# Patient Record
Sex: FEMALE | Race: BLACK OR AFRICAN AMERICAN | NOT HISPANIC OR LATINO | URBAN - METROPOLITAN AREA
[De-identification: names, ages, dates, MRNs, and addresses within clinical notes are randomized per-mention and may not be internally consistent; named-entity substitution may affect disease eponyms.]

---

## 2019-09-17 ENCOUNTER — HOSPITAL ENCOUNTER (OUTPATIENT)
Facility: HOSPITAL | Age: 69
Discharge: HOME | End: 2019-09-19
Attending: FAMILY MEDICINE | Admitting: INTERNAL MEDICINE
Payer: COMMERCIAL

## 2019-09-17 DIAGNOSIS — I63.9 CVA (CEREBRAL VASCULAR ACCIDENT): ICD-10-CM

## 2019-09-17 DIAGNOSIS — N17.9 AKI (ACUTE KIDNEY INJURY): Primary | ICD-10-CM

## 2019-09-17 DIAGNOSIS — G93.40 ACUTE ENCEPHALOPATHY: ICD-10-CM

## 2019-09-17 DIAGNOSIS — R41.82 ALTERED MENTAL STATUS: ICD-10-CM

## 2019-09-17 PROBLEM — E03.9 ACQUIRED HYPOTHYROIDISM: Status: ACTIVE | Noted: 2019-09-17

## 2019-09-17 PROBLEM — I10 ESSENTIAL HYPERTENSION: Status: ACTIVE | Noted: 2019-09-17

## 2019-09-17 LAB
ALBUMIN SERPL BCP-MCNC: 2.3 G/DL (ref 3.5–5.2)
ALP SERPL-CCNC: 63 U/L (ref 55–135)
ALT SERPL W/O P-5'-P-CCNC: 18 U/L (ref 10–44)
ANION GAP SERPL CALC-SCNC: 13 MMOL/L (ref 8–16)
AST SERPL-CCNC: 42 U/L (ref 10–40)
BASOPHILS # BLD AUTO: 0 K/UL (ref 0–0.2)
BASOPHILS NFR BLD: 0 % (ref 0–1.9)
BILIRUB SERPL-MCNC: 1.1 MG/DL (ref 0.1–1)
BNP SERPL-MCNC: 215 PG/ML (ref 0–99)
BUN SERPL-MCNC: 52 MG/DL (ref 8–23)
CALCIUM SERPL-MCNC: 10.4 MG/DL (ref 8.7–10.5)
CHLORIDE SERPL-SCNC: 97 MMOL/L (ref 95–110)
CO2 SERPL-SCNC: 24 MMOL/L (ref 23–29)
CREAT SERPL-MCNC: 1.9 MG/DL (ref 0.5–1.4)
DIFFERENTIAL METHOD: ABNORMAL
EOSINOPHIL # BLD AUTO: 0 K/UL (ref 0–0.5)
EOSINOPHIL NFR BLD: 0 % (ref 0–8)
ERYTHROCYTE [DISTWIDTH] IN BLOOD BY AUTOMATED COUNT: 14 % (ref 11.5–14.5)
EST. GFR  (AFRICAN AMERICAN): 30 ML/MIN/1.73 M^2
EST. GFR  (NON AFRICAN AMERICAN): 26 ML/MIN/1.73 M^2
GLUCOSE SERPL-MCNC: 143 MG/DL (ref 70–110)
HCT VFR BLD AUTO: 27.5 % (ref 37–48.5)
HGB BLD-MCNC: 9.2 G/DL (ref 12–16)
INR PPP: 1 (ref 0.8–1.2)
LACTATE SERPL-SCNC: 1.1 MMOL/L (ref 0.5–2.2)
LYMPHOCYTES # BLD AUTO: 0.4 K/UL (ref 1–4.8)
LYMPHOCYTES NFR BLD: 6.4 % (ref 18–48)
MCH RBC QN AUTO: 27.7 PG (ref 27–31)
MCHC RBC AUTO-ENTMCNC: 33.5 G/DL (ref 32–36)
MCV RBC AUTO: 83 FL (ref 82–98)
MONOCYTES # BLD AUTO: 0.1 K/UL (ref 0.3–1)
MONOCYTES NFR BLD: 2.3 % (ref 4–15)
NEUTROPHILS # BLD AUTO: 5.5 K/UL (ref 1.8–7.7)
NEUTROPHILS NFR BLD: 91.8 % (ref 38–73)
PLATELET # BLD AUTO: 322 K/UL (ref 150–350)
PMV BLD AUTO: 10.4 FL (ref 9.2–12.9)
POTASSIUM SERPL-SCNC: 3.6 MMOL/L (ref 3.5–5.1)
PROT SERPL-MCNC: 8 G/DL (ref 6–8.4)
PROTHROMBIN TIME: 10.8 SEC (ref 9–12.5)
RBC # BLD AUTO: 3.32 M/UL (ref 4–5.4)
SODIUM SERPL-SCNC: 134 MMOL/L (ref 136–145)
TROPONIN I SERPL DL<=0.01 NG/ML-MCNC: 0.03 NG/ML (ref 0–0.03)
WBC # BLD AUTO: 5.97 K/UL (ref 3.9–12.7)

## 2019-09-17 PROCEDURE — 80053 COMPREHEN METABOLIC PANEL: CPT

## 2019-09-17 PROCEDURE — 99291 CRITICAL CARE FIRST HOUR: CPT | Mod: 25

## 2019-09-17 PROCEDURE — 87040 BLOOD CULTURE FOR BACTERIA: CPT

## 2019-09-17 PROCEDURE — 93010 ELECTROCARDIOGRAM REPORT: CPT | Mod: ,,, | Performed by: INTERNAL MEDICINE

## 2019-09-17 PROCEDURE — 96360 HYDRATION IV INFUSION INIT: CPT

## 2019-09-17 PROCEDURE — 93010 EKG 12-LEAD: ICD-10-PCS | Mod: ,,, | Performed by: INTERNAL MEDICINE

## 2019-09-17 PROCEDURE — 63600175 PHARM REV CODE 636 W HCPCS: Performed by: INTERNAL MEDICINE

## 2019-09-17 PROCEDURE — 25000003 PHARM REV CODE 250: Performed by: INTERNAL MEDICINE

## 2019-09-17 PROCEDURE — G0378 HOSPITAL OBSERVATION PER HR: HCPCS

## 2019-09-17 PROCEDURE — 96361 HYDRATE IV INFUSION ADD-ON: CPT

## 2019-09-17 PROCEDURE — 83880 ASSAY OF NATRIURETIC PEPTIDE: CPT

## 2019-09-17 PROCEDURE — 85025 COMPLETE CBC W/AUTO DIFF WBC: CPT

## 2019-09-17 PROCEDURE — 85610 PROTHROMBIN TIME: CPT

## 2019-09-17 PROCEDURE — 93005 ELECTROCARDIOGRAM TRACING: CPT

## 2019-09-17 PROCEDURE — 36415 COLL VENOUS BLD VENIPUNCTURE: CPT

## 2019-09-17 PROCEDURE — 84484 ASSAY OF TROPONIN QUANT: CPT

## 2019-09-17 PROCEDURE — 83605 ASSAY OF LACTIC ACID: CPT

## 2019-09-17 RX ORDER — SODIUM CHLORIDE 9 MG/ML
INJECTION, SOLUTION INTRAVENOUS CONTINUOUS
Status: ACTIVE | OUTPATIENT
Start: 2019-09-17 | End: 2019-09-18

## 2019-09-17 RX ORDER — CARVEDILOL 12.5 MG/1
12.5 TABLET ORAL
COMMUNITY
Start: 2018-12-17

## 2019-09-17 RX ORDER — ZOLPIDEM TARTRATE 10 MG/1
TABLET ORAL
COMMUNITY
Start: 2019-04-18

## 2019-09-17 RX ORDER — NIFEDIPINE 90 MG/1
90 TABLET, FILM COATED, EXTENDED RELEASE ORAL
COMMUNITY
Start: 2019-01-10

## 2019-09-17 RX ORDER — ASPIRIN 325 MG
325 TABLET ORAL DAILY
Status: DISCONTINUED | OUTPATIENT
Start: 2019-09-18 | End: 2019-09-19 | Stop reason: HOSPADM

## 2019-09-17 RX ORDER — SERTRALINE HYDROCHLORIDE 25 MG/1
TABLET, FILM COATED ORAL
COMMUNITY
Start: 2019-08-05

## 2019-09-17 RX ORDER — LEVOTHYROXINE SODIUM 25 UG/1
25 TABLET ORAL
Status: DISCONTINUED | OUTPATIENT
Start: 2019-09-18 | End: 2019-09-19 | Stop reason: HOSPADM

## 2019-09-17 RX ORDER — OXYBUTYNIN CHLORIDE 5 MG/1
TABLET, EXTENDED RELEASE ORAL
COMMUNITY
Start: 2017-09-29

## 2019-09-17 RX ORDER — DOCUSATE CALCIUM 240 MG
240 CAPSULE ORAL
COMMUNITY

## 2019-09-17 RX ORDER — SODIUM CHLORIDE 0.9 % (FLUSH) 0.9 %
10 SYRINGE (ML) INJECTION
Status: DISCONTINUED | OUTPATIENT
Start: 2019-09-17 | End: 2019-09-18 | Stop reason: SDUPTHER

## 2019-09-17 RX ORDER — ASPIRIN 81 MG/1
81 TABLET ORAL ONCE
Status: COMPLETED | OUTPATIENT
Start: 2019-09-17 | End: 2019-09-17

## 2019-09-17 RX ORDER — PRAVASTATIN SODIUM 40 MG/1
40 TABLET ORAL
COMMUNITY
Start: 2017-10-03

## 2019-09-17 RX ORDER — ASPIRIN 325 MG
325 TABLET ORAL
COMMUNITY

## 2019-09-17 RX ORDER — FERROUS SULFATE 325(65) MG
TABLET ORAL
COMMUNITY
Start: 2019-06-06

## 2019-09-17 RX ORDER — ANASTROZOLE 1 MG/1
1 TABLET ORAL
COMMUNITY
Start: 2018-12-26

## 2019-09-17 RX ORDER — HYDROCHLOROTHIAZIDE 12.5 MG/1
12.5 CAPSULE ORAL
Status: ON HOLD | COMMUNITY
Start: 2018-10-30 | End: 2019-09-18 | Stop reason: HOSPADM

## 2019-09-17 RX ORDER — LEVOTHYROXINE SODIUM 25 UG/1
TABLET ORAL
COMMUNITY
Start: 2019-06-27

## 2019-09-17 RX ORDER — PRAVASTATIN SODIUM 20 MG/1
40 TABLET ORAL DAILY
Status: DISCONTINUED | OUTPATIENT
Start: 2019-09-18 | End: 2019-09-19 | Stop reason: HOSPADM

## 2019-09-17 RX ORDER — ENOXAPARIN SODIUM 100 MG/ML
30 INJECTION SUBCUTANEOUS EVERY 24 HOURS
Status: DISCONTINUED | OUTPATIENT
Start: 2019-09-17 | End: 2019-09-19 | Stop reason: HOSPADM

## 2019-09-17 RX ADMIN — ASPIRIN 81 MG: 81 TABLET, COATED ORAL at 09:09

## 2019-09-17 RX ADMIN — SODIUM CHLORIDE: 0.9 INJECTION, SOLUTION INTRAVENOUS at 09:09

## 2019-09-17 RX ADMIN — ENOXAPARIN SODIUM 30 MG: 100 INJECTION SUBCUTANEOUS at 09:09

## 2019-09-17 NOTE — ED PROVIDER NOTES
"SCRIBE #1 NOTE: I, Hayde Yepez, am scribing for, and in the presence of, Jenn Hardin MD. I have scribed the entire note.       History     Chief Complaint   Patient presents with    Altered Mental Status     " transfer from [facility]"     Review of patient's allergies indicates:   Allergen Reactions    Lortab [hydrocodone-acetaminophen]          History of Present Illness     HPI    9/17/2019, 4:55 PM  History obtained from the family and patient      History of Present Illness: Mary Churchill is a 69 y.o. female patient with a PMHx of DM, high cholesterol, and HTN who presents to the Emergency Department for evaluation of AMS. Patient was transferred here from [facility]. Family reports low BP, dehydration, confusion, and decreased appetie for past 3 weeks. She was last admitted on 9/9/19. Symptoms are constant and moderate in severity. No mitigating or exacerbating factors reported. No associated sxs. Patient denies any CP, SOB, fever ,chills, palpitations, leg swelling, cough, n/v/d, abdominal pain, dysuria, hematuria, and all other sxs at this time. No further complaints or concerns at this time.         Arrival mode: Personal vehicle    PCP: unknown        Past Medical History:  Past Medical History:   Diagnosis Date    Arthritis     bilateral knees    Cancer     Breast cancer, bilateral    Diabetes mellitus     High cholesterol     Hypertension     Insomnia        Past Surgical History:  History reviewed. No pertinent surgical history.      Family History:  History reviewed. No pertinent family history.    Social History:  Social History     Tobacco Use    Smoking status: Never Smoker    Smokeless tobacco: Never Used   Substance and Sexual Activity    Alcohol use: Not Currently    Drug use: Never    Sexual activity: unknown        Review of Systems     Review of Systems   Constitutional: Positive for appetite change (decreased). Negative for activity change, " chills, diaphoresis, fatigue and fever.        (+) low BP  (+) dehydration   HENT: Negative for congestion, ear pain, nosebleeds, rhinorrhea, sinus pain, sore throat and trouble swallowing.    Eyes: Negative for pain and discharge.   Respiratory: Negative for cough, chest tightness, shortness of breath, wheezing and stridor.    Cardiovascular: Negative for chest pain, palpitations and leg swelling.   Gastrointestinal: Negative for abdominal distention, abdominal pain, blood in stool, constipation, diarrhea, nausea and vomiting.   Genitourinary: Negative for difficulty urinating, dysuria, flank pain, frequency, hematuria and urgency.   Musculoskeletal: Negative for arthralgias, back pain, myalgias and neck pain.   Skin: Negative for pallor, rash and wound.   Neurological: Negative for dizziness, syncope, weakness, light-headedness, numbness and headaches.   Hematological: Does not bruise/bleed easily.   Psychiatric/Behavioral: Positive for confusion. Negative for self-injury.   All other systems reviewed and are negative.     Physical Exam     Initial Vitals [09/17/19 1612]   BP Pulse Resp Temp SpO2   135/72 92 (!) 26 98.9 °F (37.2 °C) 98 %      MAP       --          Physical Exam  Nursing Notes and Vital Signs Reviewed.  Constitutional: Patient is in no acute distress. Frail.  Head: Atraumatic. Normocephalic.  Eyes: PERRL. EOM intact. Conjunctivae are not pale. No scleral icterus.  ENT: Mucous membranes are moist. Oropharynx is clear and symmetric.    Neck: Supple. Full ROM. No lymphadenopathy.  Cardiovascular: Regular rate. Regular rhythm. No murmurs, rubs, or gallops. Distal pulses are 2+ and symmetric.  Pulmonary/Chest: No respiratory distress. Diminished breath sounds bilaterally with rales.   Abdominal: Soft and non-distended.  There is no tenderness.  No rebound, guarding, or rigidity. Good bowel sounds.  Genitourinary: No CVA tenderness  Musculoskeletal: Moves all extremities. No obvious deformities. No  "edema. No calf tenderness.  Skin: Warm and dry.  Neurological:  Alert, awake, and appropriate. Follows commands. Normal speech.  No acute focal neurological deficits are appreciated.  Psychiatric: Normal affect. Good eye contact. Appropriate in content.     ED Course   Critical Care  Date/Time: 9/17/2019 7:45 PM  Performed by: Jenn Hardin MD  Authorized by: Jenn Hardin MD   Direct patient critical care time: 22 minutes  Additional history critical care time: 7 minutes  Ordering / reviewing critical care time: 18 minutes  Documentation critical care time: 6 minutes  Consulting other physicians critical care time: 7 minutes  Total critical care time (exclusive of procedural time) : 60 minutes  Critical care time was exclusive of separately billable procedures and treating other patients and teaching time.  Critical care was necessary to treat or prevent imminent or life-threatening deterioration of the following conditions: acute kidney injury.  Critical care was time spent personally by me on the following activities: blood draw for specimens, development of treatment plan with patient or surrogate, interpretation of cardiac output measurements, discussions with consultants, evaluation of patient's response to treatment, examination of patient, obtaining history from patient or surrogate, ordering and performing treatments and interventions, ordering and review of laboratory studies, ordering and review of radiographic studies, pulse oximetry, re-evaluation of patient's condition and review of old charts.        ED Vital Signs:  Vitals:    09/18/19 1154 09/18/19 1340 09/18/19 1540 09/18/19 1730   BP:   126/69    Pulse:  60 79 65   Resp:   18    Temp:   97 °F (36.1 °C)    TempSrc:   Oral    SpO2:   98%    Weight: 59.5 kg (131 lb 2.8 oz)      Height: 5' 4" (1.626 m)       09/18/19 1926 09/18/19 2100 09/18/19 2302 09/19/19 0058   BP: 110/61   118/67   Pulse: 69 65 (!) 58 65   Resp: 16   14   Temp: 98.6 °F " (37 °C)   98.1 °F (36.7 °C)   TempSrc: Oral   Oral   SpO2: 96% 96%  100%   Weight:       Height:        09/19/19 0101 09/19/19 0302 09/19/19 0353 09/19/19 0501   BP:   125/66    Pulse: (!) 58 (!) 55 64 (!) 59   Resp:   14    Temp:   97.9 °F (36.6 °C)    TempSrc:   Oral    SpO2:   99%    Weight:       Height:        09/19/19 0545 09/19/19 0715 09/19/19 0743   BP:  131/64    Pulse:  60    Resp:  17    Temp:  98.1 °F (36.7 °C)    TempSrc:  Oral    SpO2:  (!) 93% (!) 93%   Weight: 57.7 kg (127 lb 3.3 oz)     Height:          Abnormal Lab Results:  Labs Reviewed   CBC W/ AUTO DIFFERENTIAL - Abnormal; Notable for the following components:       Result Value    RBC 3.32 (*)     Hemoglobin 9.2 (*)     Hematocrit 27.5 (*)     Lymph # 0.4 (*)     Mono # 0.1 (*)     Gran% 91.8 (*)     Lymph% 6.4 (*)     Mono% 2.3 (*)     All other components within normal limits   COMPREHENSIVE METABOLIC PANEL - Abnormal; Notable for the following components:    Sodium 134 (*)     Glucose 143 (*)     BUN, Bld 52 (*)     Creatinine 1.9 (*)     Albumin 2.3 (*)     Total Bilirubin 1.1 (*)     AST 42 (*)     eGFR if  30 (*)     eGFR if non  26 (*)     All other components within normal limits   TROPONIN I - Abnormal; Notable for the following components:    Troponin I 0.033 (*)     All other components within normal limits   B-TYPE NATRIURETIC PEPTIDE - Abnormal; Notable for the following components:     (*)     All other components within normal limits   PROTIME-INR   LACTIC ACID, PLASMA        All Lab Results:  Results for orders placed or performed during the hospital encounter of 09/17/19   Blood culture #1   Result Value Ref Range    Blood Culture, Routine No Growth to date     Blood Culture, Routine No Growth to date     Blood Culture, Routine No Growth to date    Blood culture #2   Result Value Ref Range    Blood Culture, Routine No Growth to date     Blood Culture, Routine No Growth to date     Blood  Culture, Routine No Growth to date    CBC auto differential   Result Value Ref Range    WBC 5.97 3.90 - 12.70 K/uL    RBC 3.32 (L) 4.00 - 5.40 M/uL    Hemoglobin 9.2 (L) 12.0 - 16.0 g/dL    Hematocrit 27.5 (L) 37.0 - 48.5 %    Mean Corpuscular Volume 83 82 - 98 fL    Mean Corpuscular Hemoglobin 27.7 27.0 - 31.0 pg    Mean Corpuscular Hemoglobin Conc 33.5 32.0 - 36.0 g/dL    RDW 14.0 11.5 - 14.5 %    Platelets 322 150 - 350 K/uL    MPV 10.4 9.2 - 12.9 fL    Gran # (ANC) 5.5 1.8 - 7.7 K/uL    Lymph # 0.4 (L) 1.0 - 4.8 K/uL    Mono # 0.1 (L) 0.3 - 1.0 K/uL    Eos # 0.0 0.0 - 0.5 K/uL    Baso # 0.00 0.00 - 0.20 K/uL    Gran% 91.8 (H) 38.0 - 73.0 %    Lymph% 6.4 (L) 18.0 - 48.0 %    Mono% 2.3 (L) 4.0 - 15.0 %    Eosinophil% 0.0 0.0 - 8.0 %    Basophil% 0.0 0.0 - 1.9 %    Differential Method Automated    Comprehensive metabolic panel   Result Value Ref Range    Sodium 134 (L) 136 - 145 mmol/L    Potassium 3.6 3.5 - 5.1 mmol/L    Chloride 97 95 - 110 mmol/L    CO2 24 23 - 29 mmol/L    Glucose 143 (H) 70 - 110 mg/dL    BUN, Bld 52 (H) 8 - 23 mg/dL    Creatinine 1.9 (H) 0.5 - 1.4 mg/dL    Calcium 10.4 8.7 - 10.5 mg/dL    Total Protein 8.0 6.0 - 8.4 g/dL    Albumin 2.3 (L) 3.5 - 5.2 g/dL    Total Bilirubin 1.1 (H) 0.1 - 1.0 mg/dL    Alkaline Phosphatase 63 55 - 135 U/L    AST 42 (H) 10 - 40 U/L    ALT 18 10 - 44 U/L    Anion Gap 13 8 - 16 mmol/L    eGFR if African American 30 (A) >60 mL/min/1.73 m^2    eGFR if non African American 26 (A) >60 mL/min/1.73 m^2   Protime-INR   Result Value Ref Range    Prothrombin Time 10.8 9.0 - 12.5 sec    INR 1.0 0.8 - 1.2   Troponin I   Result Value Ref Range    Troponin I 0.033 (H) 0.000 - 0.026 ng/mL   B-Type natriuretic peptide (BNP)   Result Value Ref Range     (H) 0 - 99 pg/mL   Lactic acid, plasma   Result Value Ref Range    Lactate (Lactic Acid) 1.1 0.5 - 2.2 mmol/L   Urinalysis, Reflex to Urine Culture Urine, Clean Catch   Result Value Ref Range    Specimen UA Urine, Clean  Catch     Color, UA Yellow Yellow, Straw, Rebecca    Appearance, UA Clear Clear    pH, UA 6.0 5.0 - 8.0    Specific Gravity, UA 1.010 1.005 - 1.030    Protein, UA Negative Negative    Glucose, UA Negative Negative    Ketones, UA Negative Negative    Bilirubin (UA) Negative Negative    Occult Blood UA Negative Negative    Nitrite, UA Negative Negative    Urobilinogen, UA Negative <2.0 EU/dL    Leukocytes, UA Trace (A) Negative   Comprehensive metabolic panel   Result Value Ref Range    Sodium 137 136 - 145 mmol/L    Potassium 3.6 3.5 - 5.1 mmol/L    Chloride 100 95 - 110 mmol/L    CO2 28 23 - 29 mmol/L    Glucose 120 (H) 70 - 110 mg/dL    BUN, Bld 50 (H) 8 - 23 mg/dL    Creatinine 1.7 (H) 0.5 - 1.4 mg/dL    Calcium 10.5 8.7 - 10.5 mg/dL    Total Protein 7.4 6.0 - 8.4 g/dL    Albumin 2.1 (L) 3.5 - 5.2 g/dL    Total Bilirubin 0.7 0.1 - 1.0 mg/dL    Alkaline Phosphatase 57 55 - 135 U/L    AST 30 10 - 40 U/L    ALT 18 10 - 44 U/L    Anion Gap 9 8 - 16 mmol/L    eGFR if African American 34 (A) >60 mL/min/1.73 m^2    eGFR if non African American 30 (A) >60 mL/min/1.73 m^2   Magnesium   Result Value Ref Range    Magnesium 2.1 1.6 - 2.6 mg/dL   Phosphorus   Result Value Ref Range    Phosphorus 3.1 2.7 - 4.5 mg/dL   Lipid panel   Result Value Ref Range    Cholesterol 110 (L) 120 - 199 mg/dL    Triglycerides 116 30 - 150 mg/dL    HDL 21 (L) 40 - 75 mg/dL    LDL Cholesterol 65.8 63.0 - 159.0 mg/dL    Hdl/Cholesterol Ratio 19.1 (L) 20.0 - 50.0 %    Total Cholesterol/HDL Ratio 5.2 (H) 2.0 - 5.0    Non-HDL Cholesterol 89 mg/dL   Hemoglobin A1c   Result Value Ref Range    Hemoglobin A1C 6.2 (H) 4.0 - 5.6 %    Estimated Avg Glucose 131 68 - 131 mg/dL   TSH   Result Value Ref Range    TSH 1.537 0.400 - 4.000 uIU/mL   Troponin I   Result Value Ref Range    Troponin I <0.006 0.000 - 0.026 ng/mL   CK-MB   Result Value Ref Range    CPK 77 20 - 180 U/L    CPK MB 1.1 0.1 - 6.5 ng/mL    MB% 1.4 0.0 - 5.0 %   CBC auto differential   Result  Value Ref Range    WBC 4.68 3.90 - 12.70 K/uL    RBC 2.92 (L) 4.00 - 5.40 M/uL    Hemoglobin 8.1 (L) 12.0 - 16.0 g/dL    Hematocrit 24.6 (L) 37.0 - 48.5 %    Mean Corpuscular Volume 84 82 - 98 fL    Mean Corpuscular Hemoglobin 27.7 27.0 - 31.0 pg    Mean Corpuscular Hemoglobin Conc 32.9 32.0 - 36.0 g/dL    RDW 14.2 11.5 - 14.5 %    Platelets 318 150 - 350 K/uL    MPV 10.1 9.2 - 12.9 fL    Gran # (ANC) 4.0 1.8 - 7.7 K/uL    Lymph # 0.5 (L) 1.0 - 4.8 K/uL    Mono # 0.2 (L) 0.3 - 1.0 K/uL    Eos # 0.0 0.0 - 0.5 K/uL    Baso # 0.00 0.00 - 0.20 K/uL    Gran% 84.8 (H) 38.0 - 73.0 %    Lymph% 10.7 (L) 18.0 - 48.0 %    Mono% 4.9 4.0 - 15.0 %    Eosinophil% 0.0 0.0 - 8.0 %    Basophil% 0.0 0.0 - 1.9 %    Differential Method Automated    APTT   Result Value Ref Range    aPTT 34.4 (H) 21.0 - 32.0 sec   Protime-INR   Result Value Ref Range    Prothrombin Time 11.0 9.0 - 12.5 sec    INR 1.0 0.8 - 1.2   Urinalysis Microscopic   Result Value Ref Range    WBC, UA 5 0 - 5 /hpf    Bacteria Occasional None-Occ /hpf    Squam Epithel, UA 1 /hpf    Microscopic Comment SEE COMMENT    Urinalysis, Reflex to Urine Culture Urine, Clean Catch   Result Value Ref Range    Specimen UA Urine, Clean Catch     Color, UA Yellow Yellow, Straw, Rebecca    Appearance, UA Clear Clear    pH, UA 6.0 5.0 - 8.0    Specific Gravity, UA 1.010 1.005 - 1.030    Protein, UA Negative Negative    Glucose, UA Negative Negative    Ketones, UA Negative Negative    Bilirubin (UA) Negative Negative    Occult Blood UA Negative Negative    Nitrite, UA Negative Negative    Urobilinogen, UA Negative <2.0 EU/dL    Leukocytes, UA Trace (A) Negative   Urinalysis Microscopic   Result Value Ref Range    WBC, UA 3 0 - 5 /hpf    Microscopic Comment SEE COMMENT    Basic metabolic panel   Result Value Ref Range    Sodium 138 136 - 145 mmol/L    Potassium 3.8 3.5 - 5.1 mmol/L    Chloride 102 95 - 110 mmol/L    CO2 28 23 - 29 mmol/L    Glucose 106 70 - 110 mg/dL    BUN, Bld 43 (H) 8 -  23 mg/dL    Creatinine 1.3 0.5 - 1.4 mg/dL    Calcium 10.5 8.7 - 10.5 mg/dL    Anion Gap 8 8 - 16 mmol/L    eGFR if African American 48 (A) >60 mL/min/1.73 m^2    eGFR if non African American 41 (A) >60 mL/min/1.73 m^2   Comprehensive metabolic panel   Result Value Ref Range    Sodium 140 136 - 145 mmol/L    Potassium 4.0 3.5 - 5.1 mmol/L    Chloride 104 95 - 110 mmol/L    CO2 28 23 - 29 mmol/L    Glucose 99 70 - 110 mg/dL    BUN, Bld 36 (H) 8 - 23 mg/dL    Creatinine 1.1 0.5 - 1.4 mg/dL    Calcium 10.6 (H) 8.7 - 10.5 mg/dL    Total Protein 7.1 6.0 - 8.4 g/dL    Albumin 2.1 (L) 3.5 - 5.2 g/dL    Total Bilirubin 0.5 0.1 - 1.0 mg/dL    Alkaline Phosphatase 57 55 - 135 U/L    AST 27 10 - 40 U/L    ALT 20 10 - 44 U/L    Anion Gap 8 8 - 16 mmol/L    eGFR if African American 58 (A) >60 mL/min/1.73 m^2    eGFR if non African American 51 (A) >60 mL/min/1.73 m^2   CBC auto differential   Result Value Ref Range    WBC 5.80 3.90 - 12.70 K/uL    RBC 2.94 (L) 4.00 - 5.40 M/uL    Hemoglobin 8.1 (L) 12.0 - 16.0 g/dL    Hematocrit 25.3 (L) 37.0 - 48.5 %    Mean Corpuscular Volume 86 82 - 98 fL    Mean Corpuscular Hemoglobin 27.6 27.0 - 31.0 pg    Mean Corpuscular Hemoglobin Conc 32.0 32.0 - 36.0 g/dL    RDW 14.4 11.5 - 14.5 %    Platelets 372 (H) 150 - 350 K/uL    MPV 10.0 9.2 - 12.9 fL    Gran # (ANC) 4.8 1.8 - 7.7 K/uL    Lymph # 0.6 (L) 1.0 - 4.8 K/uL    Mono # 0.4 0.3 - 1.0 K/uL    Eos # 0.0 0.0 - 0.5 K/uL    Baso # 0.00 0.00 - 0.20 K/uL    Gran% 83.3 (H) 38.0 - 73.0 %    Lymph% 10.0 (L) 18.0 - 48.0 %    Mono% 7.2 4.0 - 15.0 %    Eosinophil% 0.0 0.0 - 8.0 %    Basophil% 0.0 0.0 - 1.9 %    Platelet Estimate Appears normal     Aniso Slight     Poik Moderate     Poly Occasional     Ovalocytes Moderate     Target Cells Occasional     Differential Method Automated    Comprehensive metabolic panel   Result Value Ref Range    Sodium 140 136 - 145 mmol/L    Potassium 4.0 3.5 - 5.1 mmol/L    Chloride 104 95 - 110 mmol/L    CO2 28 23 -  29 mmol/L    Glucose 99 70 - 110 mg/dL    BUN, Bld 36 (H) 8 - 23 mg/dL    Creatinine 1.1 0.5 - 1.4 mg/dL    Calcium 10.6 (H) 8.7 - 10.5 mg/dL    Total Protein 7.1 6.0 - 8.4 g/dL    Albumin 2.1 (L) 3.5 - 5.2 g/dL    Total Bilirubin 0.5 0.1 - 1.0 mg/dL    Alkaline Phosphatase 57 55 - 135 U/L    AST 27 10 - 40 U/L    ALT 20 10 - 44 U/L    Anion Gap 8 8 - 16 mmol/L    eGFR if African American 58 (A) >60 mL/min/1.73 m^2    eGFR if non African American 51 (A) >60 mL/min/1.73 m^2   Magnesium   Result Value Ref Range    Magnesium 2.1 1.6 - 2.6 mg/dL   Phosphorus   Result Value Ref Range    Phosphorus 2.7 2.7 - 4.5 mg/dL   CBC auto differential   Result Value Ref Range    WBC 5.80 3.90 - 12.70 K/uL    RBC 2.94 (L) 4.00 - 5.40 M/uL    Hemoglobin 8.1 (L) 12.0 - 16.0 g/dL    Hematocrit 25.3 (L) 37.0 - 48.5 %    Mean Corpuscular Volume 86 82 - 98 fL    Mean Corpuscular Hemoglobin 27.6 27.0 - 31.0 pg    Mean Corpuscular Hemoglobin Conc 32.0 32.0 - 36.0 g/dL    RDW 14.4 11.5 - 14.5 %    Platelets 372 (H) 150 - 350 K/uL    MPV 10.0 9.2 - 12.9 fL    Gran # (ANC) 4.8 1.8 - 7.7 K/uL    Lymph # 0.6 (L) 1.0 - 4.8 K/uL    Mono # 0.4 0.3 - 1.0 K/uL    Eos # 0.0 0.0 - 0.5 K/uL    Baso # 0.00 0.00 - 0.20 K/uL    Gran% 83.3 (H) 38.0 - 73.0 %    Lymph% 10.0 (L) 18.0 - 48.0 %    Mono% 7.2 4.0 - 15.0 %    Eosinophil% 0.0 0.0 - 8.0 %    Basophil% 0.0 0.0 - 1.9 %    Platelet Estimate Appears normal     Aniso Slight     Poik Moderate     Poly Occasional     Ovalocytes Moderate     Target Cells Occasional     Differential Method Automated    Troponin I   Result Value Ref Range    Troponin I <0.006 0.000 - 0.026 ng/mL   CK-MB   Result Value Ref Range    CPK 13 (L) 20 - 180 U/L    CPK MB 0.4 0.1 - 6.5 ng/mL    MB% 3.1 0.0 - 5.0 %   APTT   Result Value Ref Range    aPTT 33.6 (H) 21.0 - 32.0 sec   Protime-INR   Result Value Ref Range    Prothrombin Time 10.9 9.0 - 12.5 sec    INR 1.0 0.8 - 1.2   Echo doppler color flow   Result Value Ref Range    QEF  60 55 - 65    Diastolic Dysfunction Yes (A)     Aortic Valve Regurgitation MILD     Est. PA Systolic Pressure 47.62 (A)     Pericardial Effusion SMALL (A)     Mitral Valve Mobility NORMAL     Tricuspid Valve Regurgitation MILD TO MODERATE          Imaging Results:  Imaging Results          CT Head Without Contrast (Final result)  Result time 09/17/19 17:36:53    Final result by Karel Garcia MD (09/17/19 17:36:53)                 Impression:      1.  Negative for acute intracranial process. Negative for hemorrhage, or skull fracture.    2.  Atrophy, intracranial atherosclerotic disease and small vessel ischemic changes.    3.  Bilateral basal ganglia lacunar infarcts, left greater than right.    4.  Nonemergent findings as described above.    All CT scans at this facility are performed  using dose modulation techniques as appropriate to performed exam including the following:  automated exposure control; adjustment of mA and/or kV according to the patients size (this includes techniques or standardized protocols for targeted exams where dose is matched to indication/reason for exam: i.e. extremities or head);  iterative reconstruction technique.      Electronically signed by: Karel Garcia MD  Date:    09/17/2019  Time:    17:36             Narrative:    EXAMINATION:  CT HEAD WITHOUT CONTRAST    CLINICAL HISTORY:  Confusion/delirium, altered LOC, unexplained;    TECHNIQUE:  Axial images through the brain and posterior fossa were obtained without the use of IV contrast.  Sagittal and coronal reconstructions are provided for review.    COMPARISON:  No comparison studies are available.    FINDINGS:  The ventricles are midline and the CSF spaces are prominent.  The gray-white matter junction is well preserved. Negative for intracranial vascular abnormalities. Negative for mass, mass effect, cerebral edema, hemorrhage or abnormal fluid collections.  Intracranial atherosclerotic disease.  Small vessel ischemic changes.   Left greater than right basal ganglia lacunar infarcts.  Falx calcifications.    The skull and scalp are intact.    The   paranasal sinuses, mastoid air cells, middle ears and ear canals are clear. The globes are intact.                               X-Ray Chest AP Portable (Final result)  Result time 09/17/19 17:32:57    Final result by Karel Garcia MD (09/17/19 17:32:57)                 Impression:      1.  Right greater than left pleural effusions with adjacent atelectasis/consolidation.  Cardiac silhouette size enlargement.  Mild vascular congestion.  Findings most likely represent interstitial pulmonary edema although pneumonia with parapneumonic effusion, especially on the right, difficult to exclude in the right clinical setting.    2.  Incidental findings as noted above.      Electronically signed by: Karel Garcia MD  Date:    09/17/2019  Time:    17:32             Narrative:    EXAMINATION:  XR CHEST AP PORTABLE    CLINICAL HISTORY:  Chest Pain;    COMPARISON:  No comparison studies are available.    FINDINGS:  EKG leads and oxygen tubing overlie the chest which is kyphotic in position.  Patchy atelectasis/consolidation in the mid lower right lung with adjacent effusion.  Probable small left effusion with minimal adjacent atelectasis.  The upper lungs are clear. The cardiac silhouette size is enlarged.  The trachea is midline and the mediastinal width is normal. Negative for pneumothorax.  Pulmonary vasculature is mildly congested.  Negative for osseous abnormalities. Postoperative changes involve the left axillary region.  Ectatic and tortuous aorta with calcifications of the aortic knob.  Degenerative changes of the spine and both shoulder girdles.  High-riding of the right shoulder girdle, concerning for rotator cuff tear.                                 The EKG was ordered, reviewed, and independently interpreted by the ED provider.  Interpretation time: 16:55  Rate: 88 BPM  Rhythm: normal sinus  rhythm  Interpretation: Moderate voltage criteria for LVH, may be normal variant. Nonspecific T wave abnormality. No STEMI.           The Emergency Provider reviewed the vital signs and test results, which are outlined above.     ED Discussion     6:11 PM: Discussed case with Leobardo Sauceda NP ([facility]). Dr. Marshall agrees with current care and management of pt and accepts admission.   Admitting Service: [facility]   Admitting Physician: Rolando  Admit to: observation    6:15 PM: Re-evaluated pt. I have discussed test results, shared treatment plan, and the need for admission with patient and family at bedside. Pt and family express understanding at this time and agree with all information. All questions answered. Pt and family have no further questions or concerns at this time. Pt is ready for admit.       Medical Decision Making:   Clinical Tests:   Lab Tests: Ordered and Reviewed  Radiological Study: Ordered and Reviewed  Medical Tests: Ordered and Reviewed           ED Medication(s):  Medications   0.9%  NaCl infusion ( Intravenous Rate/Dose Change 9/18/19 1926)   aspirin EC tablet 81 mg (81 mg Oral Given 9/17/19 2114)         Scribe Attestation:   Scribe #1: I performed the above scribed service and the documentation accurately describes the services I performed. I attest to the accuracy of the note.     Attending:   Physician Attestation Statement for Scribe #1: I, Jenn Hardin MD, personally performed the services described in this documentation, as scribed by Hayde Yepez, in my presence, and it is both accurate and complete.           Clinical Impression       ICD-10-CM ICD-9-CM   1. CHANA (acute kidney injury) N17.9 584.9   2. Altered mental status R41.82 780.97   3. CVA (cerebral vascular accident) I63.9 434.91   4. Acute encephalopathy G93.40 348.30       Disposition:   Disposition: Admitted  Condition: Fair         Jenn Hardin MD  09/20/19 6781

## 2019-09-18 PROBLEM — G93.40 ACUTE ENCEPHALOPATHY: Status: RESOLVED | Noted: 2019-09-17 | Resolved: 2019-09-18

## 2019-09-18 PROBLEM — I63.9 CVA (CEREBRAL VASCULAR ACCIDENT): Status: RESOLVED | Noted: 2019-09-17 | Resolved: 2019-09-18

## 2019-09-18 PROBLEM — N17.9 AKI (ACUTE KIDNEY INJURY): Status: RESOLVED | Noted: 2019-09-17 | Resolved: 2019-09-18

## 2019-09-18 LAB
ALBUMIN SERPL BCP-MCNC: 2.1 G/DL (ref 3.5–5.2)
ALP SERPL-CCNC: 57 U/L (ref 55–135)
ALT SERPL W/O P-5'-P-CCNC: 18 U/L (ref 10–44)
ANION GAP SERPL CALC-SCNC: 8 MMOL/L (ref 8–16)
ANION GAP SERPL CALC-SCNC: 9 MMOL/L (ref 8–16)
AORTIC VALVE REGURGITATION: ABNORMAL
APTT BLDCRRT: 34.4 SEC (ref 21–32)
AST SERPL-CCNC: 30 U/L (ref 10–40)
BACTERIA #/AREA URNS HPF: NORMAL /HPF
BASOPHILS # BLD AUTO: 0 K/UL (ref 0–0.2)
BASOPHILS NFR BLD: 0 % (ref 0–1.9)
BILIRUB SERPL-MCNC: 0.7 MG/DL (ref 0.1–1)
BILIRUB UR QL STRIP: NEGATIVE
BILIRUB UR QL STRIP: NEGATIVE
BUN SERPL-MCNC: 43 MG/DL (ref 8–23)
BUN SERPL-MCNC: 50 MG/DL (ref 8–23)
CALCIUM SERPL-MCNC: 10.5 MG/DL (ref 8.7–10.5)
CALCIUM SERPL-MCNC: 10.5 MG/DL (ref 8.7–10.5)
CHLORIDE SERPL-SCNC: 100 MMOL/L (ref 95–110)
CHLORIDE SERPL-SCNC: 102 MMOL/L (ref 95–110)
CHOLEST SERPL-MCNC: 110 MG/DL (ref 120–199)
CHOLEST/HDLC SERPL: 5.2 {RATIO} (ref 2–5)
CK MB SERPL-MCNC: 1.1 NG/ML (ref 0.1–6.5)
CK MB SERPL-RTO: 1.4 % (ref 0–5)
CK SERPL-CCNC: 77 U/L (ref 20–180)
CLARITY UR: CLEAR
CLARITY UR: CLEAR
CO2 SERPL-SCNC: 28 MMOL/L (ref 23–29)
CO2 SERPL-SCNC: 28 MMOL/L (ref 23–29)
COLOR UR: YELLOW
COLOR UR: YELLOW
CREAT SERPL-MCNC: 1.3 MG/DL (ref 0.5–1.4)
CREAT SERPL-MCNC: 1.7 MG/DL (ref 0.5–1.4)
DIASTOLIC DYSFUNCTION: YES
DIFFERENTIAL METHOD: ABNORMAL
EOSINOPHIL # BLD AUTO: 0 K/UL (ref 0–0.5)
EOSINOPHIL NFR BLD: 0 % (ref 0–8)
ERYTHROCYTE [DISTWIDTH] IN BLOOD BY AUTOMATED COUNT: 14.2 % (ref 11.5–14.5)
EST. GFR  (AFRICAN AMERICAN): 34 ML/MIN/1.73 M^2
EST. GFR  (AFRICAN AMERICAN): 48 ML/MIN/1.73 M^2
EST. GFR  (NON AFRICAN AMERICAN): 30 ML/MIN/1.73 M^2
EST. GFR  (NON AFRICAN AMERICAN): 41 ML/MIN/1.73 M^2
ESTIMATED AVG GLUCOSE: 131 MG/DL (ref 68–131)
ESTIMATED PA SYSTOLIC PRESSURE: 47.62
GLOBAL PERICARDIAL EFFUSION: ABNORMAL
GLUCOSE SERPL-MCNC: 106 MG/DL (ref 70–110)
GLUCOSE SERPL-MCNC: 120 MG/DL (ref 70–110)
GLUCOSE UR QL STRIP: NEGATIVE
GLUCOSE UR QL STRIP: NEGATIVE
HBA1C MFR BLD HPLC: 6.2 % (ref 4–5.6)
HCT VFR BLD AUTO: 24.6 % (ref 37–48.5)
HDLC SERPL-MCNC: 21 MG/DL (ref 40–75)
HDLC SERPL: 19.1 % (ref 20–50)
HGB BLD-MCNC: 8.1 G/DL (ref 12–16)
HGB UR QL STRIP: NEGATIVE
HGB UR QL STRIP: NEGATIVE
INR PPP: 1 (ref 0.8–1.2)
KETONES UR QL STRIP: NEGATIVE
KETONES UR QL STRIP: NEGATIVE
LDLC SERPL CALC-MCNC: 65.8 MG/DL (ref 63–159)
LEUKOCYTE ESTERASE UR QL STRIP: ABNORMAL
LEUKOCYTE ESTERASE UR QL STRIP: ABNORMAL
LYMPHOCYTES # BLD AUTO: 0.5 K/UL (ref 1–4.8)
LYMPHOCYTES NFR BLD: 10.7 % (ref 18–48)
MAGNESIUM SERPL-MCNC: 2.1 MG/DL (ref 1.6–2.6)
MCH RBC QN AUTO: 27.7 PG (ref 27–31)
MCHC RBC AUTO-ENTMCNC: 32.9 G/DL (ref 32–36)
MCV RBC AUTO: 84 FL (ref 82–98)
MICROSCOPIC COMMENT: NORMAL
MICROSCOPIC COMMENT: NORMAL
MITRAL VALVE MOBILITY: NORMAL
MONOCYTES # BLD AUTO: 0.2 K/UL (ref 0.3–1)
MONOCYTES NFR BLD: 4.9 % (ref 4–15)
NEUTROPHILS # BLD AUTO: 4 K/UL (ref 1.8–7.7)
NEUTROPHILS NFR BLD: 84.8 % (ref 38–73)
NITRITE UR QL STRIP: NEGATIVE
NITRITE UR QL STRIP: NEGATIVE
NONHDLC SERPL-MCNC: 89 MG/DL
PH UR STRIP: 6 [PH] (ref 5–8)
PH UR STRIP: 6 [PH] (ref 5–8)
PHOSPHATE SERPL-MCNC: 3.1 MG/DL (ref 2.7–4.5)
PLATELET # BLD AUTO: 318 K/UL (ref 150–350)
PMV BLD AUTO: 10.1 FL (ref 9.2–12.9)
POTASSIUM SERPL-SCNC: 3.6 MMOL/L (ref 3.5–5.1)
POTASSIUM SERPL-SCNC: 3.8 MMOL/L (ref 3.5–5.1)
PROT SERPL-MCNC: 7.4 G/DL (ref 6–8.4)
PROT UR QL STRIP: NEGATIVE
PROT UR QL STRIP: NEGATIVE
PROTHROMBIN TIME: 11 SEC (ref 9–12.5)
RBC # BLD AUTO: 2.92 M/UL (ref 4–5.4)
RETIRED EF AND QEF - SEE NOTES: 60 (ref 55–65)
SODIUM SERPL-SCNC: 137 MMOL/L (ref 136–145)
SODIUM SERPL-SCNC: 138 MMOL/L (ref 136–145)
SP GR UR STRIP: 1.01 (ref 1–1.03)
SP GR UR STRIP: 1.01 (ref 1–1.03)
SQUAMOUS #/AREA URNS HPF: 1 /HPF
TRICUSPID VALVE REGURGITATION: ABNORMAL
TRIGL SERPL-MCNC: 116 MG/DL (ref 30–150)
TROPONIN I SERPL DL<=0.01 NG/ML-MCNC: <0.006 NG/ML (ref 0–0.03)
TSH SERPL DL<=0.005 MIU/L-ACNC: 1.54 UIU/ML (ref 0.4–4)
URN SPEC COLLECT METH UR: ABNORMAL
URN SPEC COLLECT METH UR: ABNORMAL
UROBILINOGEN UR STRIP-ACNC: NEGATIVE EU/DL
UROBILINOGEN UR STRIP-ACNC: NEGATIVE EU/DL
WBC # BLD AUTO: 4.68 K/UL (ref 3.9–12.7)
WBC #/AREA URNS HPF: 3 /HPF (ref 0–5)
WBC #/AREA URNS HPF: 5 /HPF (ref 0–5)

## 2019-09-18 PROCEDURE — 92610 EVALUATE SWALLOWING FUNCTION: CPT

## 2019-09-18 PROCEDURE — 93306 TTE W/DOPPLER COMPLETE: CPT | Mod: 26,,, | Performed by: INTERNAL MEDICINE

## 2019-09-18 PROCEDURE — 97530 THERAPEUTIC ACTIVITIES: CPT

## 2019-09-18 PROCEDURE — 93306 2D ECHO WITH COLOR FLOW DOPPLER: ICD-10-PCS | Mod: 26,,, | Performed by: INTERNAL MEDICINE

## 2019-09-18 PROCEDURE — 84484 ASSAY OF TROPONIN QUANT: CPT

## 2019-09-18 PROCEDURE — 63600175 PHARM REV CODE 636 W HCPCS: Performed by: NURSE PRACTITIONER

## 2019-09-18 PROCEDURE — 83036 HEMOGLOBIN GLYCOSYLATED A1C: CPT

## 2019-09-18 PROCEDURE — 81000 URINALYSIS NONAUTO W/SCOPE: CPT

## 2019-09-18 PROCEDURE — 85610 PROTHROMBIN TIME: CPT

## 2019-09-18 PROCEDURE — 97165 OT EVAL LOW COMPLEX 30 MIN: CPT

## 2019-09-18 PROCEDURE — 97116 GAIT TRAINING THERAPY: CPT

## 2019-09-18 PROCEDURE — 80061 LIPID PANEL: CPT

## 2019-09-18 PROCEDURE — 93306 TTE W/DOPPLER COMPLETE: CPT

## 2019-09-18 PROCEDURE — 97161 PT EVAL LOW COMPLEX 20 MIN: CPT

## 2019-09-18 PROCEDURE — 85025 COMPLETE CBC W/AUTO DIFF WBC: CPT

## 2019-09-18 PROCEDURE — 80048 BASIC METABOLIC PNL TOTAL CA: CPT

## 2019-09-18 PROCEDURE — 63600175 PHARM REV CODE 636 W HCPCS: Performed by: INTERNAL MEDICINE

## 2019-09-18 PROCEDURE — 83735 ASSAY OF MAGNESIUM: CPT

## 2019-09-18 PROCEDURE — 97802 MEDICAL NUTRITION INDIV IN: CPT

## 2019-09-18 PROCEDURE — 92523 SPEECH SOUND LANG COMPREHEN: CPT

## 2019-09-18 PROCEDURE — 36415 COLL VENOUS BLD VENIPUNCTURE: CPT

## 2019-09-18 PROCEDURE — 96361 HYDRATE IV INFUSION ADD-ON: CPT

## 2019-09-18 PROCEDURE — 81000 URINALYSIS NONAUTO W/SCOPE: CPT | Mod: 91

## 2019-09-18 PROCEDURE — 82550 ASSAY OF CK (CPK): CPT

## 2019-09-18 PROCEDURE — 94761 N-INVAS EAR/PLS OXIMETRY MLT: CPT

## 2019-09-18 PROCEDURE — 84100 ASSAY OF PHOSPHORUS: CPT

## 2019-09-18 PROCEDURE — 85730 THROMBOPLASTIN TIME PARTIAL: CPT

## 2019-09-18 PROCEDURE — 25000003 PHARM REV CODE 250: Performed by: INTERNAL MEDICINE

## 2019-09-18 PROCEDURE — 84443 ASSAY THYROID STIM HORMONE: CPT

## 2019-09-18 PROCEDURE — 82553 CREATINE MB FRACTION: CPT

## 2019-09-18 PROCEDURE — 27000221 HC OXYGEN, UP TO 24 HOURS

## 2019-09-18 PROCEDURE — G0378 HOSPITAL OBSERVATION PER HR: HCPCS

## 2019-09-18 PROCEDURE — 80053 COMPREHEN METABOLIC PANEL: CPT

## 2019-09-18 RX ORDER — SODIUM CHLORIDE 0.9 % (FLUSH) 0.9 %
10 SYRINGE (ML) INJECTION
Status: DISCONTINUED | OUTPATIENT
Start: 2019-09-18 | End: 2019-09-19 | Stop reason: HOSPADM

## 2019-09-18 RX ADMIN — PRAVASTATIN SODIUM 40 MG: 20 TABLET ORAL at 11:09

## 2019-09-18 RX ADMIN — ENOXAPARIN SODIUM 30 MG: 100 INJECTION SUBCUTANEOUS at 05:09

## 2019-09-18 RX ADMIN — SODIUM CHLORIDE: 0.9 INJECTION, SOLUTION INTRAVENOUS at 06:09

## 2019-09-18 RX ADMIN — SODIUM CHLORIDE: 0.9 INJECTION, SOLUTION INTRAVENOUS at 05:09

## 2019-09-18 RX ADMIN — LEVOTHYROXINE SODIUM 25 MCG: 25 TABLET ORAL at 06:09

## 2019-09-18 RX ADMIN — ASPIRIN 325 MG ORAL TABLET 325 MG: 325 PILL ORAL at 11:09

## 2019-09-18 NOTE — NURSING
Called in to Evin Biggs, a friend as the  about the discharge order. He will contact the family about sending the patient home.

## 2019-09-18 NOTE — HOSPITAL COURSE
Mr Churchill is a 69 year old female with presented to [facility] Emergency Room with increased confusion and generalize weakness. Creatinine 2.0 at previous facility, trend down to 1.7 today. Creatinine was normal in 8/2019. CT of the head here was negative of acute intracranial process, but evidence of bilateral basal ganglia lacunar infarcts, left greater than right. MRI was done today that showed no acute findings atrophy with white matter degeneration, small chronic cerebral and cerebellar infarcts. She has been evaluated by PT/OT as well. Bilateral carotid ultrasound showed no significant stenosis. 2 D Echo showed normal LVEF with small effusion, no evidence of intracavity mass, thrombi, or vegetation. MRI results reviewed no acute findings, atrophy with white matter degeneration, small chronic cerebral and cerebellar infarcts as described above. CHANA has resolved with IVF's. She was evaluated and treated by PT/OT as well.

## 2019-09-18 NOTE — SUBJECTIVE & OBJECTIVE
Past Medical History:   Diagnosis Date    Arthritis     bilateral knees    Cancer     Breast cancer, bilateral    Diabetes mellitus     High cholesterol     Hypertension     Insomnia        History reviewed. No pertinent surgical history.    Review of patient's allergies indicates:  No Known Allergies    No current facility-administered medications on file prior to encounter.      Current Outpatient Medications on File Prior to Encounter   Medication Sig    anastrozole (ARIMIDEX) 1 mg Tab Take 1 mg by mouth.    rmv-[id number]-zinc--bianca-bor (CALTRATE 600-D PLUS MINERALS) 600 mg calcium- 800 unit-50 mg Tab Take 1 tablet by mouth.    carvedilol (COREG) 12.5 MG tablet Take 12.5 mg by mouth.    ferrous sulfate (FEOSOL) 325 mg (65 mg iron) Tab tablet TAKE 1 TABLET BY MOUTH DAILY WITH DINNER.    hydroCHLOROthiazide (MICROZIDE) 12.5 mg capsule Take 12.5 mg by mouth.    levothyroxine (SYNTHROID) 25 MCG tablet TAKE 1 TABLET BY MOUTH DAILY.    NIFEdipine (ADALAT CC) 90 MG TbSR Take 90 mg by mouth.    oxybutynin (DITROPAN-XL) 5 MG TR24     pravastatin (PRAVACHOL) 40 MG tablet Take 40 mg by mouth.    sertraline (ZOLOFT) 25 MG tablet TAKE 1 TABLET BY MOUTH DAILY.    zolpidem (AMBIEN) 10 mg Tab TAKE ONE TABLET BY MOUTH DAILY AS NEEDED FOR SLEEP    aspirin 325 MG tablet Take 325 mg by mouth.    docusate calcium (SURFAK) 240 mg capsule Take 240 mg by mouth.    VITAMIN B COMPLEX ORAL Take 1 tablet by mouth.     Family History     Reviewed and Not Pertinent        Tobacco Use    Smoking status: Never Smoker    Smokeless tobacco: Never Used   Substance and Sexual Activity    Alcohol use: Not Currently    Drug use: Never    Sexual activity: Not on file     Review of Systems   Constitutional: Positive for fatigue. Negative for chills, diaphoresis and fever.   HENT: Negative.  Negative for congestion, nosebleeds and sinus pressure.    Eyes: Negative.  Negative for photophobia and visual disturbance.    Respiratory: Negative.  Negative for cough, chest tightness, shortness of breath and wheezing.    Cardiovascular: Negative.  Negative for chest pain, palpitations and leg swelling.   Gastrointestinal: Negative.  Negative for abdominal pain, diarrhea, nausea and vomiting.   Endocrine: Negative.  Negative for polyuria.   Genitourinary: Negative.  Negative for dysuria, flank pain, frequency and urgency.   Musculoskeletal: Negative.  Negative for back pain, joint swelling and neck stiffness.   Skin: Negative.  Negative for color change, pallor and rash.   Allergic/Immunologic: Negative.  Negative for immunocompromised state.   Neurological: Positive for speech difficulty and weakness (Generalized). Negative for dizziness, syncope, numbness and headaches.   Hematological: Negative.  Negative for adenopathy. Does not bruise/bleed easily.   Psychiatric/Behavioral: Positive for confusion and decreased concentration. Negative for hallucinations. The patient is not nervous/anxious.    All other systems reviewed and are negative.    Objective:     Vital Signs (Most Recent):  Temp: 98.9 °F (37.2 °C) (09/17/19 1612)  Pulse: 80 (09/17/19 1806)  Resp: 14 (09/17/19 1806)  BP: (!) 93/53 (09/17/19 1806)  SpO2: 99 % (09/17/19 1806) Vital Signs (24h Range):  Temp:  [98 °F (36.7 °C)-98.9 °F (37.2 °C)] 98.9 °F (37.2 °C)  Pulse:  [80-95] 80  Resp:  [14-26] 14  SpO2:  [98 %-99 %] 99 %  BP: ()/(53-72) 93/53     Weight: 60.9 kg (134 lb 4.8 oz)  Body mass index is 23.05 kg/m².    Physical Exam   Constitutional: No distress.   HENT:   Head: Normocephalic and atraumatic.   Eyes: Conjunctivae and EOM are normal. No scleral icterus.   Neck: Normal range of motion. Neck supple. No thyromegaly present.   Cardiovascular: Normal rate, regular rhythm, normal heart sounds and intact distal pulses.   No murmur heard.  Pulmonary/Chest: Effort normal and breath sounds normal. No respiratory distress. She has no wheezes. She has no rales. She  exhibits no tenderness.   Abdominal: Soft. Bowel sounds are normal. She exhibits no distension. There is no tenderness.   Musculoskeletal: Normal range of motion. She exhibits no edema or tenderness.   Neurological: She is alert. She is disoriented. No cranial nerve deficit. She exhibits normal muscle tone. Coordination normal.   Patient alert, but disoriented to place and time.  Patient able to follow commands, moves bilateral upper and lower extremities, but appears globally weak.  No focal neurological deficits identified.  Gait was not evaluated.  Memory impaired.   Skin: Skin is warm and dry. She is not diaphoretic. No erythema.   Psychiatric: She has a normal mood and affect.   Nursing note and vitals reviewed.        CRANIAL NERVES     CN III, IV, VI   Extraocular motions are normal.        Significant Labs:   BMP:   Recent Labs   Lab 09/17/19  1652   *   *   K 3.6   CL 97   CO2 24   BUN 52*   CREATININE 1.9*   CALCIUM 10.4     CBC:   Recent Labs   Lab 09/17/19  1652   WBC 5.97   HGB 9.2*   HCT 27.5*        CMP:   Recent Labs   Lab 09/17/19  1652   *   K 3.6   CL 97   CO2 24   *   BUN 52*   CREATININE 1.9*   CALCIUM 10.4   PROT 8.0   ALBUMIN 2.3*   BILITOT 1.1*   ALKPHOS 63   AST 42*   ALT 18   ANIONGAP 13   EGFRNONAA 26*     Cardiac Markers:   Recent Labs   Lab 09/17/19  1652   *     Lactic Acid:   Recent Labs   Lab 09/17/19  1652   LACTATE 1.1     Troponin:   Recent Labs   Lab 09/17/19  1652   TROPONINI 0.033*     TSH: No results for input(s): TSH in the last 4320 hours.  All pertinent labs within the past 24 hours have been reviewed.    Significant Imaging: I have reviewed and interpreted all pertinent imaging results/findings within the past 24 hours.     Imaging Results          CT Head Without Contrast (Final result)  Result time 09/17/19 17:36:53    Final result by Karel Garcia MD (09/17/19 17:36:53)                 Impression:      1.  Negative for acute  intracranial process. Negative for hemorrhage, or skull fracture.    2.  Atrophy, intracranial atherosclerotic disease and small vessel ischemic changes.    3.  Bilateral basal ganglia lacunar infarcts, left greater than right.    4.  Nonemergent findings as described above.    All CT scans at this facility are performed  using dose modulation techniques as appropriate to performed exam including the following:  automated exposure control; adjustment of mA and/or kV according to the patients size (this includes techniques or standardized protocols for targeted exams where dose is matched to indication/reason for exam: i.e. extremities or head);  iterative reconstruction technique.      Electronically signed by: Karel Garcia MD  Date:    09/17/2019  Time:    17:36             Narrative:    EXAMINATION:  CT HEAD WITHOUT CONTRAST    CLINICAL HISTORY:  Confusion/delirium, altered LOC, unexplained;    TECHNIQUE:  Axial images through the brain and posterior fossa were obtained without the use of IV contrast.  Sagittal and coronal reconstructions are provided for review.    COMPARISON:  No comparison studies are available.    FINDINGS:  The ventricles are midline and the CSF spaces are prominent.  The gray-white matter junction is well preserved. Negative for intracranial vascular abnormalities. Negative for mass, mass effect, cerebral edema, hemorrhage or abnormal fluid collections.  Intracranial atherosclerotic disease.  Small vessel ischemic changes.  Left greater than right basal ganglia lacunar infarcts.  Falx calcifications.    The skull and scalp are intact.    The   paranasal sinuses, mastoid air cells, middle ears and ear canals are clear. The globes are intact.                               X-Ray Chest AP Portable (Final result)  Result time 09/17/19 17:32:57    Final result by Karel Garcia MD (09/17/19 17:32:57)                 Impression:      1.  Right greater than left pleural effusions with adjacent  atelectasis/consolidation.  Cardiac silhouette size enlargement.  Mild vascular congestion.  Findings most likely represent interstitial pulmonary edema although pneumonia with parapneumonic effusion, especially on the right, difficult to exclude in the right clinical setting.    2.  Incidental findings as noted above.      Electronically signed by: Karel Garcia MD  Date:    09/17/2019  Time:    17:32             Narrative:    EXAMINATION:  XR CHEST AP PORTABLE    CLINICAL HISTORY:  Chest Pain;    COMPARISON:  No comparison studies are available.    FINDINGS:  EKG leads and oxygen tubing overlie the chest which is kyphotic in position.  Patchy atelectasis/consolidation in the mid lower right lung with adjacent effusion.  Probable small left effusion with minimal adjacent atelectasis.  The upper lungs are clear. The cardiac silhouette size is enlarged.  The trachea is midline and the mediastinal width is normal. Negative for pneumothorax.  Pulmonary vasculature is mildly congested.  Negative for osseous abnormalities. Postoperative changes involve the left axillary region.  Ectatic and tortuous aorta with calcifications of the aortic knob.  Degenerative changes of the spine and both shoulder girdles.  High-riding of the right shoulder girdle, concerning for rotator cuff tear.                                I have independently reviewed and interpreted the EKG.    I have independently reviewed all pertinent labs within the past 24 hours.    I have independently reviewed, visualized and interpreted all pertinent imaging results within the past 24 hours and discussed the findings with the ED physician.

## 2019-09-18 NOTE — CONSULTS
"  [facility] -   Adult Nutrition  Consult Note    SUMMARY     Recommendations    Recommendation/Intervention: 1. Rec SLP consult 2. When medically able and safe for pt, ADAT Renal, DM Diet w/ texture per SLP 3. If unable to advance diet within the next 4 days, consider alternate nutrition therapy 4. RD to f/u   Goals: Meet 85% EEN/EPN while admitted  Nutrition Goal Status: new  Communication of RD Recs: POC, sticky note    Reason for Assessment    Reason For Assessment: consult  Diagnosis: CHANA, AMS  Relevant Medical History: Breast cancer, DM, High cholesterol, HTN  General Information Comments: RD consulted to assess dietary needs + stroke pathway. Pt NPO x day 1. Pt w/ providers at all visit attempts. No previous wt encounters in epic. Intake was at ~50% prior to NPO Status. NFPE + diet education to be completed @ f/u.  Nutrition Discharge Planning: pending medical course      Nutrition Risk Screen    Nutrition Risk Screen: no indicators present    Nutrition/Diet History    Spiritual, Cultural Beliefs, Judaism Practices, Values that Affect Care: no  Factors Affecting Nutritional Intake: NPO    Anthropometrics    Temp: 97.1 °F (36.2 °C)  Height Method: Stated  Height: 5' 4" (162.6 cm)  Height (inches): 64 in  Weight Method: Bed Scale  Weight: 59.5 kg (131 lb 2.8 oz)  Weight (lb): 131.18 lb  Ideal Body Weight (IBW), Female: 120 lb  % Ideal Body Weight, Female (lb): 109.32 lb  BMI (Calculated): 22.6  BMI Grade: 18.5-24.9 - normal       Lab/Procedures/Meds    Pertinent Labs Reviewed: reviewed  BMP  Lab Results   Component Value Date     09/18/2019    K 3.6 09/18/2019     09/18/2019    CO2 28 09/18/2019    BUN 50 (H) 09/18/2019    CREATININE 1.7 (H) 09/18/2019    CALCIUM 10.5 09/18/2019    ANIONGAP 9 09/18/2019    ESTGFRAFRICA 34 (A) 09/18/2019    EGFRNONAA 30 (A) 09/18/2019     Lab Results   Component Value Date    CALCIUM 10.5 09/18/2019    PHOS 3.1 09/18/2019     Lab Results   Component " Value Date    HGBA1C 6.2 (H) 09/18/2019   No results for input(s): POCTGLUCOSE in the last 24 hours.  Lab Results   Component Value Date    ALBUMIN 2.1 (L) 09/18/2019       Pertinent Medications Reviewed: reviewed    Physical Findings/Assessment  Skin: WDL    Estimated/Assessed Needs    Weight Used For Calorie Calculations: 59.5 kg (131 lb 2.8 oz)  Energy Calorie Requirements (kcal): 5647-9045  Energy Need Method: Kcal/kg(breast cancer)  Protein Requirements: 59-71g  Weight Used For Protein Calculations: 59.5 kg (131 lb 2.8 oz)     Estimated Fluid Requirement Method: RDA Method(or per MD)  RDA Method (mL): 1785         Nutrition Prescription Ordered    Current Diet Order: NPO    Evaluation of Received Nutrient/Fluid Intake    Energy Calories Required: not meeting needs  Protein Required: not meeting needs  % Intake of Estimated Energy Needs: 0 - 25 %  % Meal Intake: NPO    Nutrition Risk  [id number]    Assessment and Plan  Nutrition Problem  Inadequate oral intake     Related to (etiology):   NPO Status, no alternative means of nutrition     Signs and Symptoms (as evidenced by):   Meeting <85% EEN/EPN     Interventions/Recommendations (treatment strategy):  See above     Nutrition Diagnosis Status:   New       Monitor and Evaluation    Food and Nutrient Intake: energy intake, food and beverage intake  Food and Nutrient Adminstration: diet order  Anthropometric Measurements: weight, weight change  Biochemical Data, Medical Tests and Procedures: inflammatory profile, electrolyte and renal panel, gastrointestinal profile, lipid profile, glucose/endocrine profile  Nutrition-Focused Physical Findings: overall appearance     Malnutrition Assessment         To be completed at f/u    Nutrition Follow-Up    RD Follow-up?: Yes

## 2019-09-18 NOTE — HPI
Ms. Churchill is an elderly 69-year-old  female with PMH significant for HTN, NIDDM, hyperlipidemia, hypothyroidism, presented to [facility] for increasing confusion, generalized weakness for the past 1-2 weeks.  Patient is a poor historian.  Laboratory workup at that facility revealed creatinine of 2.0 (baseline normal), CT head was read as unremarkable and was transferred to [facility] for further evaluation.  CT head done here reveals negative for acute intracranial process, but evidence of bilateral basal ganglia lacunar infarcts, left greater than right, likely chronic in nature.  No hemorrhage seen.  Patient is a very poor historian, no family at the bedside.  She is able to follow commands, but has no focal neurological deficits but appears globally weak.  Admitting diagnosis chronic versus subacute CVA.  Will proceed with stroke workup.  Received aspirin in the ED.

## 2019-09-18 NOTE — PT/OT/SLP EVAL
Occupational Therapy   Evaluation    Name: Mary Churchill  MRN: [id number]  Admitting Diagnosis:  CHANA (acute kidney injury)      Recommendations:     Discharge Recommendations: home health OT  Discharge Equipment Recommendations:  none  Barriers to discharge:  None    Assessment:     Mary Churchill is a 69 y.o. female with a medical diagnosis of CHANA (acute kidney injury).  She presents with debility and generalized weakness. Performance deficits affecting function: weakness, impaired functional mobilty, impaired balance, decreased upper extremity function, decreased safety awareness, impaired endurance, impaired self care skills, gait instability, decreased lower extremity function, pain.      Rehab Prognosis: Good; patient would benefit from acute skilled OT services to address these deficits and reach maximum level of function.       Plan:     Patient to be seen 3 x/week to address the above listed problems via self-care/home management, therapeutic exercises, therapeutic activities  · Plan of Care Expires: 09/25/19  · Plan of Care Reviewed with: patient    Subjective     Chief Complaint: debility and generalized weakness  Patient/Family Comments/goals:     Occupational Profile:  Living Environment:   Previous level of function: assistance with adl's and mod (I) with functional mobility  Roles and Routines: occupational therapy  Equipment Used at Home:  none  Assistance upon Discharge:     Pain/Comfort:  · Pain Rating 1: 0/10    Patients cultural, spiritual, Religion conflicts given the current situation:      Objective:     Communicated with: nurse and epic chart review prior to session.  Patient found HOB elevated with telemetry, peripheral IV upon OT entry to room.    General Precautions: Standard, fall   Orthopedic Precautions:N/A   Braces: N/A     Occupational Performance:    Bed Mobility:    · Patient completed Rolling/Turning to Right with minimum assistance  · Patient completed Scooting/Bridging with  minimum assistance  · Patient completed Supine to Sit with minimum assistance    Functional Mobility/Transfers:  · Patient completed Sit <> Stand Transfer with minimum assistance  with  rolling walker   · Patient completed Toilet Transfer Step Transfer technique with minimum assistance with  bedside commode    Activities of Daily Living:  · Upper Body Dressing: minimum assistance .  · Lower Body Dressing: minimum assistance socks    Cognitive/Visual Perceptual:  Cognitive/Psychosocial Skills:     -       Oriented to: Person, Place, Time and Situation   -       Follows Commands/attention:Follows two-step commands  -       Communication: clear/fluent  -       Memory: No Deficits noted  -       Safety awareness/insight to disability: impaired   Visual/Perceptual:      -Intact .    Physical Exam:  Upper Extremity Range of Motion:     -       Right Upper Extremity: shoulder flexion approx 90 degrees  -       Left Upper Extremity: WFL  Upper Extremity Strength:    -       Right Upper Extremity: mmt: 2+ 5 grossly  -       Left Upper Extremity: mmt: 3/5 grossly   Strength:    -       Right Upper Extremity: mmt: 3/5 grossly  -       Left Upper Extremity: mmt: 3/5 grossly    AMPAC 6 Click ADL:  AMPAC Total Score: 16    Treatment & Education:  Education:    Patient left bs with all lines intact, call button in reach and cna  Acacia notified    GOALS:   Multidisciplinary Problems     Occupational Therapy Goals        Problem: Occupational Therapy Goal    Goal Priority Disciplines Outcome Interventions   Occupational Therapy Goal     OT, PT/OT     Description:  OT goals to be determine 9-26-19  sba with ue dressing  sba with le dressing  Pt will tolerate 1 set x 10 reps b ue rom exercise with min resistance  sba with bsc t/f's                      History:     Past Medical History:   Diagnosis Date    Arthritis     bilateral knees    Cancer     Breast cancer, bilateral    Diabetes mellitus     High cholesterol      Hypertension     Insomnia        History reviewed. No pertinent surgical history.    Time Tracking:     OT Date of Treatment: 09/18/19  OT Start Time: 1047  OT Stop Time: 1110  OT Total Time (min): 23 min    Billable Minutes:Evaluation 10 minutes  Therapeutic Activity 13 minutes    Linh Webster, OT  9/18/2019

## 2019-09-18 NOTE — ASSESSMENT & PLAN NOTE
CT head reveals evidence of bilateral basal ganglia lacunar infarcts, left greater than right, of unclear chronicity.  Will proceed with the CVA order set, received aspirin in the ED.  Resume statin in a.m..  PT/OT/ST evaluation.  Follow up on MRI brain, carotid Doppler, echocardiogram in a.m..

## 2019-09-18 NOTE — PT/OT/SLP EVAL
Speech Language Pathology Evaluation  Cognitive/Bedside Swallow    Patient Name:  Mary Churchill   MRN:  [id number]  Admitting Diagnosis: CVA (cerebral vascular accident)    Recommendations:                  General Recommendations:  Dysphagia therapy, Speech/language therapy and Cognitive-linguistic therapy  Diet recommendations:  Mechanical soft, Chopped meat, Thin   Aspiration Precautions: 1 bite/sip at a time, Alternating bites/sips, HOB to 90 degrees and Small bites/sips   General Precautions: Standard, aspiration, fall  Communication strategies:  none    History:     Past Medical History:   Diagnosis Date    Arthritis     bilateral knees    Cancer     Breast cancer, bilateral    Diabetes mellitus     High cholesterol     Hypertension     Insomnia        History reviewed. No pertinent surgical history.    Social History: Patient lives with her son and her .    Prior Intubation HX:      Modified Barium Swallow: unknown    Chest X-Rays:     Prior diet: regular    Occupation/hobbies/homemaking:     Subjective     Pt alert with delayed responses and decreased vocal intensity.  Patient goals: none stated    Pain/Comfort:  · Pain Rating 1: 0/10  · Pain Rating Post-Intervention 1: 0/10    Objective:     Cognitive Status:    Orientation Person and Place  Memory Delayedmax A  Problem Solving Conclusions mod A      Receptive Language:   Comprehension:      Questions Simple yes/no mod A  Commands  two step basic commands 100%  3 step DEP    Pragmatics:    Poor initiation and eye contact    Expressive Language:  Verbal:    Naming Confrontation 100%   Reduced sentence formulation  Nonverbal:         Motor Speech:  Dysarthria decreased intensity and consonant precision    Voice:   Intensity reduced    Visual-Spatial:  NT    Reading:   NT     Written Expression:   NT    Oral Musculature Evaluation  · Dentition: present and adequate  · Mucosal Quality: good    Bedside Swallow Eval:   Consistencies  Assessed:  · Thin liquid, puree, solids     Oral Phase:   · Oral residue  · Slow oral transit time    Pharyngeal Phase:   · decreased hyolaryngeal excursion to palpation    Compensatory Strategies  · None    Treatment:     Assessment:     Mary Churchill is a 69 y.o. female with an SLP diagnosis of Dysarthria and Cognitive-Linguistic Impairment and dysphagia.  She presents with a weak swallow with increased risk of aspiration and moderate cognitive deficits. Pt also presents with poor speech intelligibility and reduced vocal intensity.    Goals:   Multidisciplinary Problems     SLP Goals        Problem: SLP Goal    Goal Priority Disciplines Outcome   SLP Goal     SLP    Description:  1. Pt will recall information with mod A.  2. Pt will solve problems with min A.   3. Pt will answer y/n questions with min A.  4. Pt will complete oral motor ex x 15 each to improve swallow and speech intelligibility.                    Plan:     · Patient to be seen:  3 x/week   · Plan of Care expires:  09/25/19  · Plan of Care reviewed with:  patient   · SLP Follow-Up:  Yes       Discharge recommendations:      Barriers to Discharge:  None    Time Tracking:     SLP Treatment Date:   09/18/19  Speech Start Time:  1039  Speech Stop Time:  1107     Speech Total Time (min):  28 min    Billable Minutes: Eval 18  and Eval Swallow and Oral Function 10    Wanda García CCC-SLP  09/18/2019

## 2019-09-18 NOTE — NURSING
Bladder scanned-594 mL. Got up to the bedside commode, said she did urinate, but was a spoonful of yellowish pus-looking output. Informed NP, Leobardo.

## 2019-09-18 NOTE — PLAN OF CARE
Problem: Adult Inpatient Plan of Care  Goal: Plan of Care Review  Outcome: Ongoing (interventions implemented as appropriate)  Recommendations     Recommendation/Intervention: 1. Rec SLP consult 2. When medically able and safe for pt, ADAT Renal, DM Diet w/ texture per SLP 3. If unable to advance diet within the next 4 days, consider alternate nutrition therapy 4. RD to f/u   Goals: Meet 85% EEN/EPN while admitted  Nutrition Goal Status: new  Communication of RD Recs: POC, sticky note

## 2019-09-18 NOTE — H&P
"[facility] Hospital Medicine  History & Physical    Patient Name: Mary Churchill  MRN: [id number]  Admission Date: 9/17/2019  Attending Physician: Atif Marshall MD  Primary Care Provider: No primary care provider on file.         Patient information was obtained from patient, past medical records and ER records.     Subjective:     Principal Problem:CVA (cerebral vascular accident)    Chief Complaint:   Chief Complaint   Patient presents with    Altered Mental Status     " transfer from [facility]"        HPI: Ms. Churchill is an elderly 69-year-old  female with PMH significant for HTN, NIDDM, hyperlipidemia, hypothyroidism, presented to [facility] for increasing confusion, generalized weakness for the past 1-2 weeks.  Patient is a poor historian.  Laboratory workup at that facility revealed creatinine of 2.0 (baseline normal), CT head was read as unremarkable and was transferred to [facility] for further evaluation.  CT head done here reveals negative for acute intracranial process, but evidence of bilateral basal ganglia lacunar infarcts, left greater than right, likely chronic in nature.  No hemorrhage seen.  Patient is a very poor historian, no family at the bedside.  She is able to follow commands, but has no focal neurological deficits but appears globally weak.  Admitting diagnosis chronic versus subacute CVA.  Will proceed with stroke workup.  Received aspirin in the ED.    Past Medical History:   Diagnosis Date    Arthritis     bilateral knees    Cancer     Breast cancer, bilateral    Diabetes mellitus     High cholesterol     Hypertension     Insomnia        History reviewed. No pertinent surgical history.    Review of patient's allergies indicates:  No Known Allergies    No current facility-administered medications on file prior to encounter.      Current Outpatient Medications on File Prior to Encounter   Medication Sig    " anastrozole (ARIMIDEX) 1 mg Tab Take 1 mg by mouth.    vlg-[id number]-[id number]-zinc--bianca-bor (CALTRATE 600-D PLUS MINERALS) 600 mg calcium- 800 unit-50 mg Tab Take 1 tablet by mouth.    carvedilol (COREG) 12.5 MG tablet Take 12.5 mg by mouth.    ferrous sulfate (FEOSOL) 325 mg (65 mg iron) Tab tablet TAKE 1 TABLET BY MOUTH DAILY WITH DINNER.    hydroCHLOROthiazide (MICROZIDE) 12.5 mg capsule Take 12.5 mg by mouth.    levothyroxine (SYNTHROID) 25 MCG tablet TAKE 1 TABLET BY MOUTH DAILY.    NIFEdipine (ADALAT CC) 90 MG TbSR Take 90 mg by mouth.    oxybutynin (DITROPAN-XL) 5 MG TR24     pravastatin (PRAVACHOL) 40 MG tablet Take 40 mg by mouth.    sertraline (ZOLOFT) 25 MG tablet TAKE 1 TABLET BY MOUTH DAILY.    zolpidem (AMBIEN) 10 mg Tab TAKE ONE TABLET BY MOUTH DAILY AS NEEDED FOR SLEEP    aspirin 325 MG tablet Take 325 mg by mouth.    docusate calcium (SURFAK) 240 mg capsule Take 240 mg by mouth.    VITAMIN B COMPLEX ORAL Take 1 tablet by mouth.     Family History     Reviewed and Not Pertinent        Tobacco Use    Smoking status: Never Smoker    Smokeless tobacco: Never Used   Substance and Sexual Activity    Alcohol use: Not Currently    Drug use: Never    Sexual activity: Not on file     Review of Systems   Constitutional: Positive for fatigue. Negative for chills, diaphoresis and fever.   HENT: Negative.  Negative for congestion, nosebleeds and sinus pressure.    Eyes: Negative.  Negative for photophobia and visual disturbance.   Respiratory: Negative.  Negative for cough, chest tightness, shortness of breath and wheezing.    Cardiovascular: Negative.  Negative for chest pain, palpitations and leg swelling.   Gastrointestinal: Negative.  Negative for abdominal pain, diarrhea, nausea and vomiting.   Endocrine: Negative.  Negative for polyuria.   Genitourinary: Negative.  Negative for dysuria, flank pain, frequency and urgency.   Musculoskeletal: Negative.  Negative for back pain, joint swelling and  neck stiffness.   Skin: Negative.  Negative for color change, pallor and rash.   Allergic/Immunologic: Negative.  Negative for immunocompromised state.   Neurological: Positive for speech difficulty and weakness (Generalized). Negative for dizziness, syncope, numbness and headaches.   Hematological: Negative.  Negative for adenopathy. Does not bruise/bleed easily.   Psychiatric/Behavioral: Positive for confusion and decreased concentration. Negative for hallucinations. The patient is not nervous/anxious.    All other systems reviewed and are negative.    Objective:     Vital Signs (Most Recent):  Temp: 98.9 °F (37.2 °C) (09/17/19 1612)  Pulse: 80 (09/17/19 1806)  Resp: 14 (09/17/19 1806)  BP: (!) 93/53 (09/17/19 1806)  SpO2: 99 % (09/17/19 1806) Vital Signs (24h Range):  Temp:  [98 °F (36.7 °C)-98.9 °F (37.2 °C)] 98.9 °F (37.2 °C)  Pulse:  [80-95] 80  Resp:  [14-26] 14  SpO2:  [98 %-99 %] 99 %  BP: ()/(53-72) 93/53     Weight: 60.9 kg (134 lb 4.8 oz)  Body mass index is 23.05 kg/m².    Physical Exam   Constitutional: No distress.   HENT:   Head: Normocephalic and atraumatic.   Eyes: Conjunctivae and EOM are normal. No scleral icterus.   Neck: Normal range of motion. Neck supple. No thyromegaly present.   Cardiovascular: Normal rate, regular rhythm, normal heart sounds and intact distal pulses.   No murmur heard.  Pulmonary/Chest: Effort normal and breath sounds normal. No respiratory distress. She has no wheezes. She has no rales. She exhibits no tenderness.   Abdominal: Soft. Bowel sounds are normal. She exhibits no distension. There is no tenderness.   Musculoskeletal: Normal range of motion. She exhibits no edema or tenderness.   Neurological: She is alert. She is disoriented. No cranial nerve deficit. She exhibits normal muscle tone. Coordination normal.   Patient alert, but disoriented to place and time.  Patient able to follow commands, moves bilateral upper and lower extremities, but appears globally  weak.  No focal neurological deficits identified.  Gait was not evaluated.  Memory impaired.   Skin: Skin is warm and dry. She is not diaphoretic. No erythema.   Psychiatric: She has a normal mood and affect.   Nursing note and vitals reviewed.        CRANIAL NERVES     CN III, IV, VI   Extraocular motions are normal.        Significant Labs:   BMP:   Recent Labs   Lab 09/17/19  1652   *   *   K 3.6   CL 97   CO2 24   BUN 52*   CREATININE 1.9*   CALCIUM 10.4     CBC:   Recent Labs   Lab 09/17/19  1652   WBC 5.97   HGB 9.2*   HCT 27.5*        CMP:   Recent Labs   Lab 09/17/19  1652   *   K 3.6   CL 97   CO2 24   *   BUN 52*   CREATININE 1.9*   CALCIUM 10.4   PROT 8.0   ALBUMIN 2.3*   BILITOT 1.1*   ALKPHOS 63   AST 42*   ALT 18   ANIONGAP 13   EGFRNONAA 26*     Cardiac Markers:   Recent Labs   Lab 09/17/19 1652   *     Lactic Acid:   Recent Labs   Lab 09/17/19 1652   LACTATE 1.1     Troponin:   Recent Labs   Lab 09/17/19 1652   TROPONINI 0.033*     TSH: No results for input(s): TSH in the last 4320 hours.  All pertinent labs within the past 24 hours have been reviewed.    Significant Imaging: I have reviewed and interpreted all pertinent imaging results/findings within the past 24 hours.     Imaging Results          CT Head Without Contrast (Final result)  Result time 09/17/19 17:36:53    Final result by Karel Garcia MD (09/17/19 17:36:53)                 Impression:      1.  Negative for acute intracranial process. Negative for hemorrhage, or skull fracture.    2.  Atrophy, intracranial atherosclerotic disease and small vessel ischemic changes.    3.  Bilateral basal ganglia lacunar infarcts, left greater than right.    4.  Nonemergent findings as described above.    All CT scans at this facility are performed  using dose modulation techniques as appropriate to performed exam including the following:  automated exposure control; adjustment of mA and/or kV according to  the patients size (this includes techniques or standardized protocols for targeted exams where dose is matched to indication/reason for exam: i.e. extremities or head);  iterative reconstruction technique.      Electronically signed by: Karel Garcia MD  Date:    09/17/2019  Time:    17:36             Narrative:    EXAMINATION:  CT HEAD WITHOUT CONTRAST    CLINICAL HISTORY:  Confusion/delirium, altered LOC, unexplained;    TECHNIQUE:  Axial images through the brain and posterior fossa were obtained without the use of IV contrast.  Sagittal and coronal reconstructions are provided for review.    COMPARISON:  No comparison studies are available.    FINDINGS:  The ventricles are midline and the CSF spaces are prominent.  The gray-white matter junction is well preserved. Negative for intracranial vascular abnormalities. Negative for mass, mass effect, cerebral edema, hemorrhage or abnormal fluid collections.  Intracranial atherosclerotic disease.  Small vessel ischemic changes.  Left greater than right basal ganglia lacunar infarcts.  Falx calcifications.    The skull and scalp are intact.    The   paranasal sinuses, mastoid air cells, middle ears and ear canals are clear. The globes are intact.                               X-Ray Chest AP Portable (Final result)  Result time 09/17/19 17:32:57    Final result by Karel Garcia MD (09/17/19 17:32:57)                 Impression:      1.  Right greater than left pleural effusions with adjacent atelectasis/consolidation.  Cardiac silhouette size enlargement.  Mild vascular congestion.  Findings most likely represent interstitial pulmonary edema although pneumonia with parapneumonic effusion, especially on the right, difficult to exclude in the right clinical setting.    2.  Incidental findings as noted above.      Electronically signed by: Karel Garcia MD  Date:    09/17/2019  Time:    17:32             Narrative:    EXAMINATION:  XR CHEST AP PORTABLE    CLINICAL  HISTORY:  Chest Pain;    COMPARISON:  No comparison studies are available.    FINDINGS:  EKG leads and oxygen tubing overlie the chest which is kyphotic in position.  Patchy atelectasis/consolidation in the mid lower right lung with adjacent effusion.  Probable small left effusion with minimal adjacent atelectasis.  The upper lungs are clear. The cardiac silhouette size is enlarged.  The trachea is midline and the mediastinal width is normal. Negative for pneumothorax.  Pulmonary vasculature is mildly congested.  Negative for osseous abnormalities. Postoperative changes involve the left axillary region.  Ectatic and tortuous aorta with calcifications of the aortic knob.  Degenerative changes of the spine and both shoulder girdles.  High-riding of the right shoulder girdle, concerning for rotator cuff tear.                                I have independently reviewed and interpreted the EKG.    I have independently reviewed all pertinent labs within the past 24 hours.    I have independently reviewed, visualized and interpreted all pertinent imaging results within the past 24 hours and discussed the findings with the ED physician.            Assessment/Plan:     * CVA (cerebral vascular accident)  CT head reveals evidence of bilateral basal ganglia lacunar infarcts, left greater than right, of unclear chronicity.  Will proceed with the CVA order set, received aspirin in the ED.  Resume statin in a.m..  PT/OT/ST evaluation.  Follow up on MRI brain, carotid Doppler, echocardiogram in a.m..        Acute encephalopathy  Patient was transferred here for confusion, acute encephalopathy, for further neurological evaluation.  Patient reports generalized weakness for the past 2-3 weeks.  CT head shows evidence of subacute versus chronic bilateral lacunar infarcts.  Currently she is alert, but disoriented to time and place.      CHANA (acute kidney injury)  Creatinine was elevated at 2.0 at outside facility, repeat creatinine  1.7 here.  Unknown baseline.  Continue normal saline at 100 cc/hour for the next 24 hr.  Repeat labs in a.m..      Essential hypertension  BP low at 90 3/43.  Continue normal saline at 100 cc/hour for the next 24 hr.  Hold antihypertensive agents.  Re-evaluate in a.m..      Acquired hypothyroidism  Continue home dose Synthroid.        VTE Risk Mitigation (From admission, onward)        Ordered     enoxaparin injection 30 mg  Daily      09/17/19 1958     IP VTE HIGH RISK PATIENT  Once      09/17/19 1958     Place sequential compression device  Until discontinued      09/17/19 1958             Atif Marshall MD  Department of Hospital Medicine   [facility]

## 2019-09-18 NOTE — NURSING
Patient able to eat breakfast-- tolerated well. Instructed on NPO as ordered until seen by speech therapist.

## 2019-09-18 NOTE — PLAN OF CARE
Sw met with patient at bedside to complete assessment. Patient was eating breakfast at time of visit. Sw asked could she call someone else to complete assessment with. Patient reports to call Kalyan. Sw contacted patient's significant, Kalyan at [phone] to complete assessment. Kalyan reports patient use a single cane to ambulate. Kalyan reports patient was diagnosed with pneumonia two weeks ago from her PCP. Kalyan reports he assist her around the home. He reports he and patients son reside at the home. Patient denies any post hospital needs or services at this time. Transitional Care Folder, Discharge Planning Begins on Admission pamphlet, [facility] Bedside Delivery pamphlet, Advance Directive information given to patient. Sw placed contact information on white board. Sw instructed patient or family to call with any questions or concerns.     Pt's pcp is Dr. Montenegro in [place], [place]    D/C Plan: Home  D/C Trans: [facility]       09/18/19 0900   Discharge Assessment   Assessment Type Discharge Planning Assessment   Confirmed/corrected address and phone number on facesheet? Yes   Assessment information obtained from? Patient;Caregiver   Communicated expected length of stay with patient/caregiver yes   Prior to hospitilization cognitive status: Alert/Oriented   Prior to hospitalization functional status: Independent;Assistive Equipment   Current cognitive status: Alert/Oriented   Current Functional Status: Independent;Assistive Equipment   Facility Arrived From: Home   Lives With significant other;child(jc), adult   Able to Return to Prior Arrangements yes   Is patient able to care for self after discharge? Unable to determine at this time (comments)   Who are your caregiver(s) and their phone number(s)? Kalyan Evin, significant other, [phone]   Patient's perception of discharge disposition home or selfcare   Readmission Within the Last 30 Days no previous admission in last 30 days   Patient  currently being followed by outpatient case management? No   Patient currently receives any other outside agency services? No   Equipment Currently Used at Home cane, straight   Do you have any problems affording any of your prescribed medications? TBD   Is the patient taking medications as prescribed? yes   Does the patient have transportation home? Yes   Transportation Anticipated family or friend will provide   Does the patient receive services at the [facility]? No   Discharge Plan A Home with family   Discharge Plan B Home with family   DME Needed Upon Discharge  none   Patient/Family in Agreement with Plan yes

## 2019-09-18 NOTE — PT/OT/SLP EVAL
Physical Therapy Evaluation    Patient Name:  Mary Churchill   MRN:  [id number]    Recommendations:     Discharge Recommendations:  home health PT(24 HOUR CAREGIVER)   Discharge Equipment Recommendations: none   Barriers to discharge: None    Assessment:     Mary Churchill is a 69 y.o. female admitted with a medical diagnosis of CVA (cerebral vascular accident).  She presents with the following impairments/functional limitations:  weakness, impaired functional mobilty, impaired self care skills, impaired endurance, gait instability, decreased lower extremity function, decreased safety awareness, impaired balance .    Rehab Prognosis: Good; patient would benefit from acute skilled PT services to address these deficits and reach maximum level of function.    Recent Surgery: * No surgery found *      Plan:     During this hospitalization, patient to be seen   to address the identified rehab impairments via gait training, therapeutic activities, therapeutic exercises and progress toward the following goals:    · Plan of Care Expires:  09/25/19    Subjective     Chief Complaint: NONE   Patient/Family Comments/goals: NONE STATED   Pain/Comfort:  · Pain Rating 1: 0/10  · Pain Rating Post-Intervention 1: 0/10    Patients cultural, spiritual, Jehovah's witness conflicts given the current situation:      Living Environment:  PT LIVES AT HOME WITH SON AND SIGNIFICANT OTHER AND HAS NO STEPS TO ENTER HOME  Prior to admission, patients level of function was ASSIST WITH ALL GROSS FUNC MOBILITY WITH RW.  Equipment used at home: walker, rolling.  DME owned (not currently used): none.  Upon discharge, patient will have assistance from HER SIGNIFICANT OTHER.    Objective:     Communicated with NURSE PAGET AND Epic CHART REVIEW  prior to session.  Patient found supine with peripheral IV, telemetry  upon PT entry to room.    General Precautions: Standard, fall   Orthopedic Precautions:N/A   Braces: N/A     Exams:  · Cognitive Exam:  Patient  is oriented to Person, Place, Time and Situation  · RLE ROM: LIMITED  · RLE Strength: LIMITED  · LLE ROM: LIMITED  · LLE Strength: LIMITED    Functional Mobility:  PT MET IN RM SUP>SIT EOB WITH MOD A AND SCOOTED TO EOB WITH SBA. PT STOOD WITH RW AND CGA FOR GT X 20' WITH CGA/SBA. PT RETURNED TO RM T/F TO BSC WITH DEC SAFETY WITH RW USE. PT LEFT SEATED ON COMMODE WITH PCT PRESENT FOR BATH PT EDUCATED ON ROLE OF P.T.     AM-PAC 6 CLICK MOBILITY  Total Score:16     Patient left ON BSC  with all lines intact, call button in reach and PCT present.    GOALS:   Multidisciplinary Problems     Physical Therapy Goals        Problem: Physical Therapy Goal    Goal Priority Disciplines Outcome Goal Variances Interventions   Physical Therapy Goal     PT, PT/OT      Description:  PT WILL BE SEEN FOR P.T. FOR A MIN OF 5 OUT OF 7 DAYS A WEEK  LT19  1. PT WILL COMPLETED BED MOBILITY WITH MIN A.  2. PT WILL T/F TO CHAIR WITH RW AND CGA  3. PT WILL GT TRAIN HH DISTANCES WITH RW AND CGA>SBA  4. PT WILL COMPLETE B LE TE X 20 REPS                    History:     Past Medical History:   Diagnosis Date    Arthritis     bilateral knees    Cancer     Breast cancer, bilateral    Diabetes mellitus     High cholesterol     Hypertension     Insomnia        History reviewed. No pertinent surgical history.    Time Tracking:     PT Received On: 19  PT Start Time: 943     PT Stop Time: 1010  PT Total Time (min): 27 min     Billable Minutes: Evaluation 17 and Gait Training 10      Minna De Leon, PT  2019

## 2019-09-18 NOTE — NURSING
Family, spoke with Eunice Dawn #[phone] cannot take her tonight and nobody at home even if we get a transport for the patient--JOCELYN Booth aware of. Will send patient home tomorrow.

## 2019-09-18 NOTE — NURSING
Patient arrived to room  from ED accompanied by nurse, Gee. Transferred to bed. Heart monitor in place, vital signs taken.   Patient is aaox4 with no distress noted.  Assessment as charted.   Patient has been orientated to room, call light, fall precautions, rounding sheet, and board. No questions or concerns at this time.

## 2019-09-18 NOTE — PLAN OF CARE
Problem: Adult Inpatient Plan of Care  Goal: Plan of Care Review  Outcome: Ongoing (interventions implemented as appropriate)  POC reviewed, verbalized understanding, no evidence of learning. Pt remained free from falls, fall precautions in place. Pt is NSR on monitor, 60-80s. VSS. No other c/o at this time. PIV intact, infusing NS. Neuro checks q4h. Call bell and personal belongings within reach. Hourly rounding complete. Reminded to call for assistance. Will continue to monitor.

## 2019-09-18 NOTE — ASSESSMENT & PLAN NOTE
Creatinine was elevated at 2.0 at outside facility, repeat creatinine 1.7 here.  Unknown baseline.  Continue normal saline at 100 cc/hour for the next 24 hr.  Repeat labs in a.m..

## 2019-09-18 NOTE — PLAN OF CARE
Problem: Wound  Goal: Optimal Wound Healing  Outcome: Ongoing (interventions implemented as appropriate)  Care plan reviewed with patient.On NSR. Vitals stable. Tests have been negative. Orientation progressed throughout the day. Plan to be sent home.

## 2019-09-18 NOTE — ASSESSMENT & PLAN NOTE
Patient was transferred here for confusion, acute encephalopathy, for further neurological evaluation.  Patient reports generalized weakness for the past 2-3 weeks.  CT head shows evidence of subacute versus chronic bilateral lacunar infarcts.  Currently she is alert, but disoriented to time and place.

## 2019-09-18 NOTE — ASSESSMENT & PLAN NOTE
BP low at 90 3/43.  Continue normal saline at 100 cc/hour for the next 24 hr.  Hold antihypertensive agents.  Re-evaluate in a.m..

## 2019-09-19 VITALS
OXYGEN SATURATION: 93 % | DIASTOLIC BLOOD PRESSURE: 64 MMHG | SYSTOLIC BLOOD PRESSURE: 131 MMHG | TEMPERATURE: 98 F | HEIGHT: 64 IN | WEIGHT: 127.19 LBS | HEART RATE: 60 BPM | BODY MASS INDEX: 21.71 KG/M2 | RESPIRATION RATE: 17 BRPM

## 2019-09-19 PROBLEM — I63.9 CVA (CEREBRAL VASCULAR ACCIDENT): Status: RESOLVED | Noted: 2019-09-17 | Resolved: 2019-09-19

## 2019-09-19 PROBLEM — G93.40 ACUTE ENCEPHALOPATHY: Status: RESOLVED | Noted: 2019-09-17 | Resolved: 2019-09-19

## 2019-09-19 PROBLEM — N17.9 AKI (ACUTE KIDNEY INJURY): Status: RESOLVED | Noted: 2019-09-17 | Resolved: 2019-09-19

## 2019-09-19 LAB
ALBUMIN SERPL BCP-MCNC: 2.1 G/DL (ref 3.5–5.2)
ALBUMIN SERPL BCP-MCNC: 2.1 G/DL (ref 3.5–5.2)
ALP SERPL-CCNC: 57 U/L (ref 55–135)
ALP SERPL-CCNC: 57 U/L (ref 55–135)
ALT SERPL W/O P-5'-P-CCNC: 20 U/L (ref 10–44)
ALT SERPL W/O P-5'-P-CCNC: 20 U/L (ref 10–44)
ANION GAP SERPL CALC-SCNC: 8 MMOL/L (ref 8–16)
ANION GAP SERPL CALC-SCNC: 8 MMOL/L (ref 8–16)
ANISOCYTOSIS BLD QL SMEAR: SLIGHT
ANISOCYTOSIS BLD QL SMEAR: SLIGHT
APTT BLDCRRT: 33.6 SEC (ref 21–32)
AST SERPL-CCNC: 27 U/L (ref 10–40)
AST SERPL-CCNC: 27 U/L (ref 10–40)
BASOPHILS # BLD AUTO: 0 K/UL (ref 0–0.2)
BASOPHILS # BLD AUTO: 0 K/UL (ref 0–0.2)
BASOPHILS NFR BLD: 0 % (ref 0–1.9)
BASOPHILS NFR BLD: 0 % (ref 0–1.9)
BILIRUB SERPL-MCNC: 0.5 MG/DL (ref 0.1–1)
BILIRUB SERPL-MCNC: 0.5 MG/DL (ref 0.1–1)
BUN SERPL-MCNC: 36 MG/DL (ref 8–23)
BUN SERPL-MCNC: 36 MG/DL (ref 8–23)
CALCIUM SERPL-MCNC: 10.6 MG/DL (ref 8.7–10.5)
CALCIUM SERPL-MCNC: 10.6 MG/DL (ref 8.7–10.5)
CHLORIDE SERPL-SCNC: 104 MMOL/L (ref 95–110)
CHLORIDE SERPL-SCNC: 104 MMOL/L (ref 95–110)
CK MB SERPL-MCNC: 0.4 NG/ML (ref 0.1–6.5)
CK MB SERPL-RTO: 3.1 % (ref 0–5)
CK SERPL-CCNC: 13 U/L (ref 20–180)
CO2 SERPL-SCNC: 28 MMOL/L (ref 23–29)
CO2 SERPL-SCNC: 28 MMOL/L (ref 23–29)
CREAT SERPL-MCNC: 1.1 MG/DL (ref 0.5–1.4)
CREAT SERPL-MCNC: 1.1 MG/DL (ref 0.5–1.4)
DIFFERENTIAL METHOD: ABNORMAL
DIFFERENTIAL METHOD: ABNORMAL
EOSINOPHIL # BLD AUTO: 0 K/UL (ref 0–0.5)
EOSINOPHIL # BLD AUTO: 0 K/UL (ref 0–0.5)
EOSINOPHIL NFR BLD: 0 % (ref 0–8)
EOSINOPHIL NFR BLD: 0 % (ref 0–8)
ERYTHROCYTE [DISTWIDTH] IN BLOOD BY AUTOMATED COUNT: 14.4 % (ref 11.5–14.5)
ERYTHROCYTE [DISTWIDTH] IN BLOOD BY AUTOMATED COUNT: 14.4 % (ref 11.5–14.5)
EST. GFR  (AFRICAN AMERICAN): 58 ML/MIN/1.73 M^2
EST. GFR  (AFRICAN AMERICAN): 58 ML/MIN/1.73 M^2
EST. GFR  (NON AFRICAN AMERICAN): 51 ML/MIN/1.73 M^2
EST. GFR  (NON AFRICAN AMERICAN): 51 ML/MIN/1.73 M^2
GLUCOSE SERPL-MCNC: 99 MG/DL (ref 70–110)
GLUCOSE SERPL-MCNC: 99 MG/DL (ref 70–110)
HCT VFR BLD AUTO: 25.3 % (ref 37–48.5)
HCT VFR BLD AUTO: 25.3 % (ref 37–48.5)
HGB BLD-MCNC: 8.1 G/DL (ref 12–16)
HGB BLD-MCNC: 8.1 G/DL (ref 12–16)
INR PPP: 1 (ref 0.8–1.2)
LYMPHOCYTES # BLD AUTO: 0.6 K/UL (ref 1–4.8)
LYMPHOCYTES # BLD AUTO: 0.6 K/UL (ref 1–4.8)
LYMPHOCYTES NFR BLD: 10 % (ref 18–48)
LYMPHOCYTES NFR BLD: 10 % (ref 18–48)
MAGNESIUM SERPL-MCNC: 2.1 MG/DL (ref 1.6–2.6)
MCH RBC QN AUTO: 27.6 PG (ref 27–31)
MCH RBC QN AUTO: 27.6 PG (ref 27–31)
MCHC RBC AUTO-ENTMCNC: 32 G/DL (ref 32–36)
MCHC RBC AUTO-ENTMCNC: 32 G/DL (ref 32–36)
MCV RBC AUTO: 86 FL (ref 82–98)
MCV RBC AUTO: 86 FL (ref 82–98)
MONOCYTES # BLD AUTO: 0.4 K/UL (ref 0.3–1)
MONOCYTES # BLD AUTO: 0.4 K/UL (ref 0.3–1)
MONOCYTES NFR BLD: 7.2 % (ref 4–15)
MONOCYTES NFR BLD: 7.2 % (ref 4–15)
NEUTROPHILS # BLD AUTO: 4.8 K/UL (ref 1.8–7.7)
NEUTROPHILS # BLD AUTO: 4.8 K/UL (ref 1.8–7.7)
NEUTROPHILS NFR BLD: 83.3 % (ref 38–73)
NEUTROPHILS NFR BLD: 83.3 % (ref 38–73)
OVALOCYTES BLD QL SMEAR: ABNORMAL
OVALOCYTES BLD QL SMEAR: ABNORMAL
PHOSPHATE SERPL-MCNC: 2.7 MG/DL (ref 2.7–4.5)
PLATELET # BLD AUTO: 372 K/UL (ref 150–350)
PLATELET # BLD AUTO: 372 K/UL (ref 150–350)
PLATELET BLD QL SMEAR: ABNORMAL
PLATELET BLD QL SMEAR: ABNORMAL
PMV BLD AUTO: 10 FL (ref 9.2–12.9)
PMV BLD AUTO: 10 FL (ref 9.2–12.9)
POIKILOCYTOSIS BLD QL SMEAR: ABNORMAL
POIKILOCYTOSIS BLD QL SMEAR: ABNORMAL
POLYCHROMASIA BLD QL SMEAR: ABNORMAL
POLYCHROMASIA BLD QL SMEAR: ABNORMAL
POTASSIUM SERPL-SCNC: 4 MMOL/L (ref 3.5–5.1)
POTASSIUM SERPL-SCNC: 4 MMOL/L (ref 3.5–5.1)
PROT SERPL-MCNC: 7.1 G/DL (ref 6–8.4)
PROT SERPL-MCNC: 7.1 G/DL (ref 6–8.4)
PROTHROMBIN TIME: 10.9 SEC (ref 9–12.5)
RBC # BLD AUTO: 2.94 M/UL (ref 4–5.4)
RBC # BLD AUTO: 2.94 M/UL (ref 4–5.4)
SODIUM SERPL-SCNC: 140 MMOL/L (ref 136–145)
SODIUM SERPL-SCNC: 140 MMOL/L (ref 136–145)
TARGETS BLD QL SMEAR: ABNORMAL
TARGETS BLD QL SMEAR: ABNORMAL
TROPONIN I SERPL DL<=0.01 NG/ML-MCNC: <0.006 NG/ML (ref 0–0.03)
WBC # BLD AUTO: 5.8 K/UL (ref 3.9–12.7)
WBC # BLD AUTO: 5.8 K/UL (ref 3.9–12.7)

## 2019-09-19 PROCEDURE — 85610 PROTHROMBIN TIME: CPT

## 2019-09-19 PROCEDURE — 25000003 PHARM REV CODE 250: Performed by: INTERNAL MEDICINE

## 2019-09-19 PROCEDURE — 36415 COLL VENOUS BLD VENIPUNCTURE: CPT

## 2019-09-19 PROCEDURE — 85025 COMPLETE CBC W/AUTO DIFF WBC: CPT

## 2019-09-19 PROCEDURE — 84100 ASSAY OF PHOSPHORUS: CPT

## 2019-09-19 PROCEDURE — G0378 HOSPITAL OBSERVATION PER HR: HCPCS

## 2019-09-19 PROCEDURE — 80053 COMPREHEN METABOLIC PANEL: CPT

## 2019-09-19 PROCEDURE — 84484 ASSAY OF TROPONIN QUANT: CPT

## 2019-09-19 PROCEDURE — 82550 ASSAY OF CK (CPK): CPT

## 2019-09-19 PROCEDURE — 83735 ASSAY OF MAGNESIUM: CPT

## 2019-09-19 PROCEDURE — 85730 THROMBOPLASTIN TIME PARTIAL: CPT

## 2019-09-19 PROCEDURE — 94761 N-INVAS EAR/PLS OXIMETRY MLT: CPT

## 2019-09-19 PROCEDURE — 82553 CREATINE MB FRACTION: CPT

## 2019-09-19 RX ORDER — POLYETHYLENE GLYCOL 3350 17 G/17G
17 POWDER, FOR SOLUTION ORAL DAILY
Status: DISCONTINUED | OUTPATIENT
Start: 2019-09-19 | End: 2019-09-19

## 2019-09-19 RX ADMIN — ASPIRIN 325 MG ORAL TABLET 325 MG: 325 PILL ORAL at 08:09

## 2019-09-19 RX ADMIN — LEVOTHYROXINE SODIUM 25 MCG: 25 TABLET ORAL at 06:09

## 2019-09-19 RX ADMIN — PRAVASTATIN SODIUM 40 MG: 20 TABLET ORAL at 08:09

## 2019-09-19 NOTE — ASSESSMENT & PLAN NOTE
Creatinine was elevated at 2.0 at outside facility, repeat creatinine 1.7 here.  Unknown baseline.  Continue normal saline at 100 cc/hour for the next 24 hr.  Repeat labs in a.m..    9/18  Resolved with IVF

## 2019-09-19 NOTE — PROGRESS NOTES
[facility] Hospital Medicine  Progress Note    Patient Name: Mary Churchill  MRN: [id number]  Patient Class: OP- Observation   Admission Date: 9/17/2019  Length of Stay: 0 days  Attending Physician: Ino Raines MD  Primary Care Provider: Primary Doctor No        Subjective:     Principal Problem:CVA (cerebral vascular accident)        HPI:  Ms. Churchill is an elderly 69-year-old  female with PMH significant for HTN, NIDDM, hyperlipidemia, hypothyroidism, presented to [facility] for increasing confusion, generalized weakness for the past 1-2 weeks.  Patient is a poor historian.  Laboratory workup at that facility revealed creatinine of 2.0 (baseline normal), CT head was read as unremarkable and was transferred to [facility] for further evaluation.  CT head done here reveals negative for acute intracranial process, but evidence of bilateral basal ganglia lacunar infarcts, left greater than right, likely chronic in nature.  No hemorrhage seen.  Patient is a very poor historian, no family at the bedside.  She is able to follow commands, but has no focal neurological deficits but appears globally weak.  Admitting diagnosis chronic versus subacute CVA.  Will proceed with stroke workup.  Received aspirin in the ED.    Overview/Hospital Course:  Mr Churchill is a 69 year old female with presented to [facility] with increased confusion and generalize weakness. Creatinine 2.0 at previous facility, trend down to 1.7 today. Creatinine was normal in 8/2019. CT of the head here was negative of acute intracranial process, but evidence of bilateral basal ganglia lacunar infarcts, left greater than right. MRI was done today that showed no acute findings atrophy with white matter degeneration, small chronic cerebral and cerebellar infarcts. She has been evaluated by PT/OT as well. Bilateral carotid ultrasound showed no significant stenosis. 2 D Echo showed  normal LVEF with small effusion, no evidence of intracavity mass, thrombi, or vegetation. MRI results reviewed no acute findings, atrophy with white matter degeneration, small chronic cerebral and cerebellar infarcts as described above. CHANA has resolved with IVF's. She was evaluated and treated by PT/OT as well.     Interval History: Pt seen and examined. No distress, vital signs. Renal function has improved and back to baseline. MRI showed on acute CVA.    Review of Systems   Constitutional: Positive for fatigue. Negative for chills and fever.   HENT: Negative for congestion, rhinorrhea and sinus pressure.    Respiratory: Negative for apnea, cough, choking, chest tightness, shortness of breath, wheezing and stridor.    Cardiovascular: Negative for chest pain, palpitations and leg swelling.   Gastrointestinal: Negative for abdominal distention, abdominal pain, diarrhea, nausea and vomiting.   Endocrine: Negative for cold intolerance and heat intolerance.   Genitourinary: Negative for difficulty urinating and hematuria.   Musculoskeletal: Negative for arthralgias and joint swelling.   Skin: Negative for color change, pallor and rash.   Neurological: Positive for weakness. Negative for dizziness, seizures, numbness and headaches.   Psychiatric/Behavioral: Negative for agitation. The patient is not nervous/anxious.      Objective:     Vital Signs (Most Recent):  Temp: 98.1 °F (36.7 °C) (09/19/19 0715)  Pulse: 60 (09/19/19 0715)  Resp: 17 (09/19/19 0715)  BP: 131/64 (09/19/19 0715)  SpO2: (!) 93 % (09/19/19 0743) Vital Signs (24h Range):  Temp:  [97 °F (36.1 °C)-98.6 °F (37 °C)] 98.1 °F (36.7 °C)  Pulse:  [55-79] 60  Resp:  [14-18] 17  SpO2:  [93 %-100 %] 93 %  BP: (110-133)/(61-69) 131/64     Weight: 57.7 kg (127 lb 3.3 oz)  Body mass index is 21.83 kg/m².    Intake/Output Summary (Last 24 hours) at 9/19/2019 0839  Last data filed at 9/19/2019 0626  Gross per 24 hour   Intake 1873.33 ml   Output 2020 ml   Net -146.67  ml      Physical Exam   Constitutional: She is oriented to person, place, and time. No distress.   HENT:   Mouth/Throat: No oropharyngeal exudate.   Eyes: Right eye exhibits no discharge. Left eye exhibits no discharge.   Neck: No JVD present.   Cardiovascular: Exam reveals no gallop and no friction rub.   No murmur heard.  Pulmonary/Chest: No stridor. No respiratory distress. She has no wheezes. She has no rales. She exhibits no tenderness.   Abdominal: Soft. Bowel sounds are normal. She exhibits no distension and no mass. There is no tenderness. There is no rebound and no guarding. No hernia.   Musculoskeletal: She exhibits no edema or deformity.   Neurological: She is alert and oriented to person, place, and time.   Skin: Skin is warm and dry. Capillary refill takes less than 2 seconds. She is not diaphoretic.   Nursing note and vitals reviewed.      Significant Labs:   CBC:   Recent Labs   Lab 09/17/19  1652 09/18/19  0507 09/19/19  0509   WBC 5.97 4.68 5.80  5.80   HGB 9.2* 8.1* 8.1*  8.1*   HCT 27.5* 24.6* 25.3*  25.3*    318 372*  372*     CMP:   Recent Labs   Lab 09/17/19  1652 09/18/19  0507 09/18/19  1610 09/19/19  0509   * 137 138 140  140   K 3.6 3.6 3.8 4.0  4.0   CL 97 100 102 104  104   CO2 24 28 28 28  28   * 120* 106 99  99   BUN 52* 50* 43* 36*  36*   CREATININE 1.9* 1.7* 1.3 1.1  1.1   CALCIUM 10.4 10.5 10.5 10.6*  10.6*   PROT 8.0 7.4  --  7.1  7.1   ALBUMIN 2.3* 2.1*  --  2.1*  2.1*   BILITOT 1.1* 0.7  --  0.5  0.5   ALKPHOS 63 57  --  57  57   AST 42* 30  --  27  27   ALT 18 18  --  20  20   ANIONGAP 13 9 8 8  8   EGFRNONAA 26* 30* 41* 51*  51*       Significant Imaging:     Imaging Results          CT Head Without Contrast (Final result)  Result time 09/17/19 17:36:53    Final result by Karel Garcia MD (09/17/19 17:36:53)                 Impression:      1.  Negative for acute intracranial process. Negative for hemorrhage, or skull fracture.    2.   Atrophy, intracranial atherosclerotic disease and small vessel ischemic changes.    3.  Bilateral basal ganglia lacunar infarcts, left greater than right.    4.  Nonemergent findings as described above.    All CT scans at this facility are performed  using dose modulation techniques as appropriate to performed exam including the following:  automated exposure control; adjustment of mA and/or kV according to the patients size (this includes techniques or standardized protocols for targeted exams where dose is matched to indication/reason for exam: i.e. extremities or head);  iterative reconstruction technique.      Electronically signed by: Karel Garcia MD  Date:    09/17/2019  Time:    17:36             Narrative:    EXAMINATION:  CT HEAD WITHOUT CONTRAST    CLINICAL HISTORY:  Confusion/delirium, altered LOC, unexplained;    TECHNIQUE:  Axial images through the brain and posterior fossa were obtained without the use of IV contrast.  Sagittal and coronal reconstructions are provided for review.    COMPARISON:  No comparison studies are available.    FINDINGS:  The ventricles are midline and the CSF spaces are prominent.  The gray-white matter junction is well preserved. Negative for intracranial vascular abnormalities. Negative for mass, mass effect, cerebral edema, hemorrhage or abnormal fluid collections.  Intracranial atherosclerotic disease.  Small vessel ischemic changes.  Left greater than right basal ganglia lacunar infarcts.  Falx calcifications.    The skull and scalp are intact.    The   paranasal sinuses, mastoid air cells, middle ears and ear canals are clear. The globes are intact.                               X-Ray Chest AP Portable (Final result)  Result time 09/17/19 17:32:57    Final result by Karel Garcia MD (09/17/19 17:32:57)                 Impression:      1.  Right greater than left pleural effusions with adjacent atelectasis/consolidation.  Cardiac silhouette size enlargement.  Mild  vascular congestion.  Findings most likely represent interstitial pulmonary edema although pneumonia with parapneumonic effusion, especially on the right, difficult to exclude in the right clinical setting.    2.  Incidental findings as noted above.      Electronically signed by: Karel Garcia MD  Date:    09/17/2019  Time:    17:32             Narrative:    EXAMINATION:  XR CHEST AP PORTABLE    CLINICAL HISTORY:  Chest Pain;    COMPARISON:  No comparison studies are available.    FINDINGS:  EKG leads and oxygen tubing overlie the chest which is kyphotic in position.  Patchy atelectasis/consolidation in the mid lower right lung with adjacent effusion.  Probable small left effusion with minimal adjacent atelectasis.  The upper lungs are clear. The cardiac silhouette size is enlarged.  The trachea is midline and the mediastinal width is normal. Negative for pneumothorax.  Pulmonary vasculature is mildly congested.  Negative for osseous abnormalities. Postoperative changes involve the left axillary region.  Ectatic and tortuous aorta with calcifications of the aortic knob.  Degenerative changes of the spine and both shoulder girdles.  High-riding of the right shoulder girdle, concerning for rotator cuff tear.                                Assessment/Plan:      * CVA (cerebral vascular accident)  CT head reveals evidence of bilateral basal ganglia lacunar infarcts, left greater than right, of unclear chronicity.  Will proceed with the CVA order set, received aspirin in the ED.  Resume statin in a.m..  PT/OT/ST evaluation.  Follow up on MRI brain, carotid Doppler, echocardiogram in a.m..    9/18  MRI showed no acute findings, atrophy with white matter degeneration.  Small chronic cerebral and cerebellar infarcts as described above.  Resolved         Essential hypertension  BP low at 90 3/43.  Continue normal saline at 100 cc/hour for the next 24 hr.  Hold antihypertensive agents.    9/18  BP stable       Acquired  hypothyroidism  Continue home dose Synthroid.      Acute encephalopathy  Patient was transferred here for confusion, acute encephalopathy, for further neurological evaluation.  Patient reports generalized weakness for the past 2-3 weeks.  CT head shows evidence of subacute versus chronic bilateral lacunar infarcts.  Currently she is alert, but disoriented to time and place.    9/18  Resolved, pt awake       CHANA (acute kidney injury)  Creatinine was elevated at 2.0 at outside facility, repeat creatinine 1.7 here.  Unknown baseline.  Continue normal saline at 100 cc/hour for the next 24 hr.    9/18  Resolved with IVF       VTE Risk Mitigation (From admission, onward)        Ordered     enoxaparin injection 30 mg  Daily      09/17/19 1958     IP VTE HIGH RISK PATIENT  Once      09/17/19 1958     Place sequential compression device  Until discontinued      09/17/19 1958                Leobardo Sauceda NP  Department of Hospital Medicine   [facility]

## 2019-09-19 NOTE — NURSING
Went over discharge instructions with patient.   Stressed importance of making and keeping all follow ups as well as making prescribed medication changes.   No new prescriptions written.  IV and telemetry maintained.  PFC requested after family weren't reachable by phone.  Patient awaiting transportation.  Primary nurse notified of pt's discharge status.

## 2019-09-19 NOTE — PLAN OF CARE
Reyna called patient's significant other Kalyan at [phone]. He reports he is at home. Reyna explained the hospital will send patient to her home. Reyna verified address and float nurse place PFC order for transportation. No further needs at this time.

## 2019-09-19 NOTE — NURSING
Discharge summary, health teachings and instructions given to patient. IV removed-no complications noted. Tele monitor removed and returned to the monitor room. Reminded of the importance of follow-up appointments. Wheeled out of the facility by the transport staff with personal belongings at hand.

## 2019-09-19 NOTE — PLAN OF CARE
Reyna sent referral to [facility] at [phone]. Sw called [phone] to verify they accept patient's insurance. Staff reports there is a waiting list. Sw will add contact information to patient's AVS.

## 2019-09-19 NOTE — DISCHARGE SUMMARY
[facility] Hospital Medicine  Discharge Summary      Patient Name: Mary Churchill  MRN: [id number]  Admission Date: 9/17/2019  Hospital Length of Stay: 0 days  Discharge Date and Time:  09/19/2019 9:03 am  Attending Physician: Ino Raines MD   Discharging Provider: Leobardo Sauceda NP  Primary Care Provider: Primary Doctor No      HPI:   Ms. Churchill is an elderly 69-year-old  female with PMH significant for HTN, NIDDM, hyperlipidemia, hypothyroidism, presented to [facility] for increasing confusion, generalized weakness for the past 1-2 weeks.  Patient is a poor historian.  Laboratory workup at that facility revealed creatinine of 2.0 (baseline normal), CT head was read as unremarkable and was transferred to [facility] for further evaluation.  CT head done here reveals negative for acute intracranial process, but evidence of bilateral basal ganglia lacunar infarcts, left greater than right, likely chronic in nature.  No hemorrhage seen.  Patient is a very poor historian, no family at the bedside.  She is able to follow commands, but has no focal neurological deficits but appears globally weak.  Admitting diagnosis chronic versus subacute CVA.  Will proceed with stroke workup.  Received aspirin in the ED.    * No surgery found *      Hospital Course:   Mr Churchill is a 69 year old female with presented to [facility] Emergency Room with increased confusion and generalize weakness. Creatinine 2.0 at previous facility, trend down to 1.7 today. Creatinine was normal in 8/2019. CT of the head here was negative of acute intracranial process, but evidence of bilateral basal ganglia lacunar infarcts, left greater than right. MRI was done today that showed no acute findings atrophy with white matter degeneration, small chronic cerebral and cerebellar infarcts. She has been evaluated by PT/OT as well. Bilateral carotid ultrasound showed no significant stenosis. 2  D Echo showed normal LVEF with small effusion, no evidence of intracavity mass, thrombi, or vegetation. MRI results reviewed no acute findings, atrophy with white matter degeneration, small chronic cerebral and cerebellar infarcts as described above. CHANA has resolved with IVF's. She was evaluated and treated by PT/OT and will follow up with PCP as outpatient. Patient was seen, examined and deemed suitable for discharge.      Consults:   Consults (From admission, onward)        Status Ordering Provider     Inpatient consult to Registered Dietitian/Nutritionist  Once     Provider:  (Not yet assigned)    Completed CONRADO CAMPBELL     Inpatient consult to Registered Dietitian/Nutritionist  Once     Provider:  (Not yet assigned)    Completed SEA LITTLE     Inpatient consult to Social Work  Once     Provider:  (Not yet assigned)    Completed SEA LITTLE     IP consult to case management/social work  Once     Provider:  (Not yet assigned)    Completed CONRADO CAMPBELL     IP consult to case management/social work  Once     Provider:  (Not yet assigned)    Completed SEA LITTLE          No new Assessment & Plan notes have been filed under this hospital service since the last note was generated.  Service: Hospital Medicine    Final Active Diagnoses:    Diagnosis Date Noted POA    Acquired hypothyroidism [E03.9] 09/17/2019 Yes    Essential hypertension [I10] 09/17/2019 Yes      Problems Resolved During this Admission:    Diagnosis Date Noted Date Resolved POA    PRINCIPAL PROBLEM:  CHANA (acute kidney injury) [N17.9] 09/17/2019 09/18/2019 Yes    CVA (cerebral vascular accident) [I63.9] 09/17/2019 09/18/2019 Yes    Acute encephalopathy [G93.40] 09/17/2019 09/18/2019 Yes       Discharged Condition: stable    Disposition: Home or Self Care    Follow Up:  Follow-up Information     Moni Izquierdo MD. Schedule an appointment as soon as possible for a visit in 1 day.    Specialty:  Family Medicine  Why:  hospital  follow for Acute kidney injury and altered mental status   Contact information:  [address]  [facility] [address]  [phone]                 Patient Instructions:      Referral to OutPatient  Physical therapy   Referral Priority: Routine Referral Type: Physical Medicine   Referral Reason: Specialty Services Required   Requested Specialty: Physical Therapy   Number of Visits Requested: 1     Referral to Outpatient Occupational therapy   Referral Priority: Routine Referral Type: Occupational Therapy   Referral Reason: Specialty Services Required   Requested Specialty: Occupational Therapy   Number of Visits Requested: 1     Diet Adult Regular   Scheduling Instructions: Sit up while eatting     Order Specific Question Answer Comments   Additional restrictions: Dental Soft      Notify your health care provider if you experience any of the following:  persistent dizziness, light-headedness, or visual disturbances     Activity as tolerated       Significant Diagnostic Studies: Labs:   CMP   Recent Labs   Lab 09/17/19  1652 09/18/19  0507 09/18/19  1610   * 137 138   K 3.6 3.6 3.8   CL 97 100 102   CO2 24 28 28   * 120* 106   BUN 52* 50* 43*   CREATININE 1.9* 1.7* 1.3   CALCIUM 10.4 10.5 10.5   PROT 8.0 7.4  --    ALBUMIN 2.3* 2.1*  --    BILITOT 1.1* 0.7  --    ALKPHOS 63 57  --    AST 42* 30  --    ALT 18 18  --    ANIONGAP 13 9 8   ESTGFRAFRICA 30* 34* 48*   EGFRNONAA 26* 30* 41*    and CBC   Recent Labs   Lab 09/17/19  1652 09/18/19  0507   WBC 5.97 4.68   HGB 9.2* 8.1*   HCT 27.5* 24.6*    318       Pending Diagnostic Studies:     Procedure Component Value Units Date/Time    Urinalysis - clean catch [[id number]] Collected:  09/18/19 1400    Order Status:  Sent Lab Status:  In process Updated:  09/18/19 5697    Specimen:  Urine, Clean Catch          Medications:  Reconciled Home Medications:      Medication List      CONTINUE taking these medications    anastrozole 1 mg Tab  Commonly known as:   ARIMIDEX  Take 1 mg by mouth.     aspirin 325 MG tablet  Take 325 mg by mouth.     CALTRATE 600-D PLUS MINERALS 600 mg calcium- 800 unit-50 mg Tab  Generic drug:  [id number]--bianca-bor  Take 1 tablet by mouth.     carvedilol 12.5 MG tablet  Commonly known as:  COREG  Take 12.5 mg by mouth.     docusate calcium 240 mg capsule  Commonly known as:  SURFAK  Take 240 mg by mouth.     ferrous sulfate 325 mg (65 mg iron) Tab tablet  Commonly known as:  FEOSOL  TAKE 1 TABLET BY MOUTH DAILY WITH DINNER.     levothyroxine 25 MCG tablet  Commonly known as:  SYNTHROID  TAKE 1 TABLET BY MOUTH DAILY.     NIFEdipine 90 MG Tbsr  Commonly known as:  ADALAT CC  Take 90 mg by mouth.     oxybutynin 5 MG Tr24  Commonly known as:  DITROPAN-XL     pravastatin 40 MG tablet  Commonly known as:  PRAVACHOL  Take 40 mg by mouth.     sertraline 25 MG tablet  Commonly known as:  ZOLOFT  TAKE 1 TABLET BY MOUTH DAILY.     VITAMIN B COMPLEX ORAL  Take 1 tablet by mouth.     zolpidem 10 mg Tab  Commonly known as:  AMBIEN  TAKE ONE TABLET BY MOUTH DAILY AS NEEDED FOR SLEEP        STOP taking these medications    hydroCHLOROthiazide 12.5 mg capsule  Commonly known as:  MICROZIDE            Indwelling Lines/Drains at time of discharge:   Lines/Drains/Airways          None          Time spent on the discharge of patient: 40 minutes  Patient was seen and examined on the date of discharge and determined to be suitable for discharge.         Leobardo Sauceda NP  Department of Hospital Medicine  [facility]

## 2019-09-19 NOTE — PLAN OF CARE
Problem: Adult Inpatient Plan of Care  Goal: Plan of Care Review  Outcome: Ongoing (interventions implemented as appropriate)  POC reviewed, verbalized understanding. Pt remained free from falls, fall precautions in place. Pt is SB-NSR on monitor. VSS. No other c/o at this time. PIV intact, saline locked. Neuro checks q4h.  Call bell and personal belongings within reach. Hourly rounding complete. Discharge orders are complete for patient. Reminded to call for assistance. Will continue to monitor.

## 2019-09-19 NOTE — SUBJECTIVE & OBJECTIVE
Interval History: Pt seen and examined. No distress, vital signs. Renal function has improved and back to baseline. MRI showed on acute CVA.    Review of Systems   Constitutional: Positive for fatigue. Negative for chills and fever.   HENT: Negative for congestion, rhinorrhea and sinus pressure.    Respiratory: Negative for apnea, cough, choking, chest tightness, shortness of breath, wheezing and stridor.    Cardiovascular: Negative for chest pain, palpitations and leg swelling.   Gastrointestinal: Negative for abdominal distention, abdominal pain, diarrhea, nausea and vomiting.   Endocrine: Negative for cold intolerance and heat intolerance.   Genitourinary: Negative for difficulty urinating and hematuria.   Musculoskeletal: Negative for arthralgias and joint swelling.   Skin: Negative for color change, pallor and rash.   Neurological: Positive for weakness. Negative for dizziness, seizures, numbness and headaches.   Psychiatric/Behavioral: Negative for agitation. The patient is not nervous/anxious.      Objective:     Vital Signs (Most Recent):  Temp: 98.1 °F (36.7 °C) (09/19/19 0715)  Pulse: 60 (09/19/19 0715)  Resp: 17 (09/19/19 0715)  BP: 131/64 (09/19/19 0715)  SpO2: (!) 93 % (09/19/19 0743) Vital Signs (24h Range):  Temp:  [97 °F (36.1 °C)-98.6 °F (37 °C)] 98.1 °F (36.7 °C)  Pulse:  [55-79] 60  Resp:  [14-18] 17  SpO2:  [93 %-100 %] 93 %  BP: (110-133)/(61-69) 131/64     Weight: 57.7 kg (127 lb 3.3 oz)  Body mass index is 21.83 kg/m².    Intake/Output Summary (Last 24 hours) at 9/19/2019 0839  Last data filed at 9/19/2019 0626  Gross per 24 hour   Intake 1873.33 ml   Output 2020 ml   Net -146.67 ml      Physical Exam   Constitutional: She is oriented to person, place, and time. No distress.   HENT:   Mouth/Throat: No oropharyngeal exudate.   Eyes: Right eye exhibits no discharge. Left eye exhibits no discharge.   Neck: No JVD present.   Cardiovascular: Exam reveals no gallop and no friction rub.   No murmur  heard.  Pulmonary/Chest: No stridor. No respiratory distress. She has no wheezes. She has no rales. She exhibits no tenderness.   Abdominal: Soft. Bowel sounds are normal. She exhibits no distension and no mass. There is no tenderness. There is no rebound and no guarding. No hernia.   Musculoskeletal: She exhibits no edema or deformity.   Neurological: She is alert and oriented to person, place, and time.   Skin: Skin is warm and dry. Capillary refill takes less than 2 seconds. She is not diaphoretic.   Nursing note and vitals reviewed.      Significant Labs:   CBC:   Recent Labs   Lab 09/17/19  1652 09/18/19  0507 09/19/19  0509   WBC 5.97 4.68 5.80  5.80   HGB 9.2* 8.1* 8.1*  8.1*   HCT 27.5* 24.6* 25.3*  25.3*    318 372*  372*     CMP:   Recent Labs   Lab 09/17/19  1652 09/18/19  0507 09/18/19  1610 09/19/19  0509   * 137 138 140  140   K 3.6 3.6 3.8 4.0  4.0   CL 97 100 102 104  104   CO2 24 28 28 28  28   * 120* 106 99  99   BUN 52* 50* 43* 36*  36*   CREATININE 1.9* 1.7* 1.3 1.1  1.1   CALCIUM 10.4 10.5 10.5 10.6*  10.6*   PROT 8.0 7.4  --  7.1  7.1   ALBUMIN 2.3* 2.1*  --  2.1*  2.1*   BILITOT 1.1* 0.7  --  0.5  0.5   ALKPHOS 63 57  --  57  57   AST 42* 30  --  27  27   ALT 18 18  --  20  20   ANIONGAP 13 9 8 8  8   EGFRNONAA 26* 30* 41* 51*  51*       Significant Imaging:     Imaging Results          CT Head Without Contrast (Final result)  Result time 09/17/19 17:36:53    Final result by Karel Garcia MD (09/17/19 17:36:53)                 Impression:      1.  Negative for acute intracranial process. Negative for hemorrhage, or skull fracture.    2.  Atrophy, intracranial atherosclerotic disease and small vessel ischemic changes.    3.  Bilateral basal ganglia lacunar infarcts, left greater than right.    4.  Nonemergent findings as described above.    All CT scans at this facility are performed  using dose modulation techniques as appropriate to performed exam  including the following:  automated exposure control; adjustment of mA and/or kV according to the patients size (this includes techniques or standardized protocols for targeted exams where dose is matched to indication/reason for exam: i.e. extremities or head);  iterative reconstruction technique.      Electronically signed by: Karel Garcia MD  Date:    09/17/2019  Time:    17:36             Narrative:    EXAMINATION:  CT HEAD WITHOUT CONTRAST    CLINICAL HISTORY:  Confusion/delirium, altered LOC, unexplained;    TECHNIQUE:  Axial images through the brain and posterior fossa were obtained without the use of IV contrast.  Sagittal and coronal reconstructions are provided for review.    COMPARISON:  No comparison studies are available.    FINDINGS:  The ventricles are midline and the CSF spaces are prominent.  The gray-white matter junction is well preserved. Negative for intracranial vascular abnormalities. Negative for mass, mass effect, cerebral edema, hemorrhage or abnormal fluid collections.  Intracranial atherosclerotic disease.  Small vessel ischemic changes.  Left greater than right basal ganglia lacunar infarcts.  Falx calcifications.    The skull and scalp are intact.    The   paranasal sinuses, mastoid air cells, middle ears and ear canals are clear. The globes are intact.                               X-Ray Chest AP Portable (Final result)  Result time 09/17/19 17:32:57    Final result by Karel Garcia MD (09/17/19 17:32:57)                 Impression:      1.  Right greater than left pleural effusions with adjacent atelectasis/consolidation.  Cardiac silhouette size enlargement.  Mild vascular congestion.  Findings most likely represent interstitial pulmonary edema although pneumonia with parapneumonic effusion, especially on the right, difficult to exclude in the right clinical setting.    2.  Incidental findings as noted above.      Electronically signed by: Karel Garcia  MD  Date:    09/17/2019  Time:    17:32             Narrative:    EXAMINATION:  XR CHEST AP PORTABLE    CLINICAL HISTORY:  Chest Pain;    COMPARISON:  No comparison studies are available.    FINDINGS:  EKG leads and oxygen tubing overlie the chest which is kyphotic in position.  Patchy atelectasis/consolidation in the mid lower right lung with adjacent effusion.  Probable small left effusion with minimal adjacent atelectasis.  The upper lungs are clear. The cardiac silhouette size is enlarged.  The trachea is midline and the mediastinal width is normal. Negative for pneumothorax.  Pulmonary vasculature is mildly congested.  Negative for osseous abnormalities. Postoperative changes involve the left axillary region.  Ectatic and tortuous aorta with calcifications of the aortic knob.  Degenerative changes of the spine and both shoulder girdles.  High-riding of the right shoulder girdle, concerning for rotator cuff tear.

## 2019-09-19 NOTE — ASSESSMENT & PLAN NOTE
Patient was transferred here for confusion, acute encephalopathy, for further neurological evaluation.  Patient reports generalized weakness for the past 2-3 weeks.  CT head shows evidence of subacute versus chronic bilateral lacunar infarcts.  Currently she is alert, but disoriented to time and place.    9/18  Resolved, pt awake

## 2019-09-19 NOTE — ASSESSMENT & PLAN NOTE
CT head reveals evidence of bilateral basal ganglia lacunar infarcts, left greater than right, of unclear chronicity.  Will proceed with the CVA order set, received aspirin in the ED.  Resume statin in a.m..  PT/OT/ST evaluation.  Follow up on MRI brain, carotid Doppler, echocardiogram in a.m..    9/18  MRI showed no acute findings, atrophy with white matter degeneration.  Small chronic cerebral and cerebellar infarcts as described above.  Resolved

## 2019-09-23 LAB
BACTERIA BLD CULT: NORMAL
BACTERIA BLD CULT: NORMAL